# Patient Record
Sex: FEMALE | Race: WHITE | HISPANIC OR LATINO | Employment: STUDENT | URBAN - METROPOLITAN AREA
[De-identification: names, ages, dates, MRNs, and addresses within clinical notes are randomized per-mention and may not be internally consistent; named-entity substitution may affect disease eponyms.]

---

## 2023-03-28 DIAGNOSIS — F33.41 RECURRENT MAJOR DEPRESSIVE DISORDER, IN PARTIAL REMISSION (CMS-HCC): Primary | ICD-10-CM

## 2023-03-28 PROBLEM — E88.819 INSULIN RESISTANCE: Status: ACTIVE | Noted: 2023-03-28

## 2023-03-28 PROBLEM — F32.A DEPRESSION: Status: ACTIVE | Noted: 2023-03-28

## 2023-03-28 PROBLEM — J30.9 ALLERGIC RHINITIS: Status: ACTIVE | Noted: 2023-03-28

## 2023-03-28 PROBLEM — J30.2 SEASONAL ALLERGIES: Status: ACTIVE | Noted: 2023-03-28

## 2023-03-28 PROBLEM — F90.9 ADHD (ATTENTION DEFICIT HYPERACTIVITY DISORDER): Status: ACTIVE | Noted: 2023-03-28

## 2023-03-28 PROBLEM — R79.89 ELEVATED TESTOSTERONE LEVEL IN FEMALE: Status: ACTIVE | Noted: 2023-03-28

## 2023-03-28 PROBLEM — E28.8 HYPERANDROGENISM: Status: ACTIVE | Noted: 2023-03-28

## 2023-03-28 PROBLEM — E28.2 PCOS (POLYCYSTIC OVARIAN SYNDROME): Status: ACTIVE | Noted: 2023-03-28

## 2023-03-28 PROBLEM — R79.89 ELEVATED DEHYDROEPIANDROSTERONE SULFATE LEVEL: Status: ACTIVE | Noted: 2023-03-28

## 2023-03-28 PROBLEM — G40.909 EPILEPSY, UNSPECIFIED, NOT INTRACTABLE, WITHOUT STATUS EPILEPTICUS (MULTI): Status: ACTIVE | Noted: 2023-03-28

## 2023-03-28 PROBLEM — F84.0 AUTISM (HHS-HCC): Status: ACTIVE | Noted: 2023-03-28

## 2023-03-28 RX ORDER — NORGESTIMATE AND ETHINYL ESTRADIOL 0.25-0.035
1 KIT ORAL DAILY
COMMUNITY
Start: 2018-08-06

## 2023-03-28 RX ORDER — SERTRALINE HYDROCHLORIDE 100 MG/1
100 TABLET, FILM COATED ORAL 2 TIMES DAILY
Qty: 60 TABLET | Refills: 0 | Status: SHIPPED | OUTPATIENT
Start: 2023-03-28 | End: 2023-04-26

## 2023-03-28 RX ORDER — SERTRALINE HYDROCHLORIDE 100 MG/1
1 TABLET, FILM COATED ORAL 2 TIMES DAILY
COMMUNITY
Start: 2023-02-19 | End: 2023-03-28 | Stop reason: SDUPTHER

## 2023-03-28 RX ORDER — OXCARBAZEPINE 300 MG/1
TABLET, FILM COATED ORAL 2 TIMES DAILY
COMMUNITY
End: 2023-12-05 | Stop reason: SDUPTHER

## 2023-03-28 RX ORDER — METFORMIN HYDROCHLORIDE 500 MG/1
1000 TABLET ORAL 2 TIMES DAILY
COMMUNITY

## 2023-03-28 RX ORDER — POLYETHYLENE GLYCOL 3350 17 G/17G
17 POWDER, FOR SOLUTION ORAL DAILY
COMMUNITY
Start: 2020-08-12 | End: 2023-07-28 | Stop reason: ALTCHOICE

## 2023-03-28 RX ORDER — LISDEXAMFETAMINE DIMESYLATE 40 MG/1
40 CAPSULE ORAL
COMMUNITY
End: 2023-05-23 | Stop reason: SDUPTHER

## 2023-04-23 DIAGNOSIS — F33.41 RECURRENT MAJOR DEPRESSIVE DISORDER, IN PARTIAL REMISSION (CMS-HCC): ICD-10-CM

## 2023-04-26 RX ORDER — SERTRALINE HYDROCHLORIDE 100 MG/1
TABLET, FILM COATED ORAL
Qty: 60 TABLET | Refills: 0 | Status: SHIPPED | OUTPATIENT
Start: 2023-04-26 | End: 2023-05-24

## 2023-05-09 LAB
ALANINE AMINOTRANSFERASE (SGPT) (U/L) IN SER/PLAS: 12 U/L (ref 7–45)
ALBUMIN (G/DL) IN SER/PLAS: 4.4 G/DL (ref 3.4–5)
ALKALINE PHOSPHATASE (U/L) IN SER/PLAS: 75 U/L (ref 33–110)
ANION GAP IN SER/PLAS: 15 MMOL/L (ref 10–20)
ASPARTATE AMINOTRANSFERASE (SGOT) (U/L) IN SER/PLAS: 18 U/L (ref 9–39)
BILIRUBIN TOTAL (MG/DL) IN SER/PLAS: 0.3 MG/DL (ref 0–1.2)
CALCIUM (MG/DL) IN SER/PLAS: 9.7 MG/DL (ref 8.6–10.6)
CARBON DIOXIDE, TOTAL (MMOL/L) IN SER/PLAS: 27 MMOL/L (ref 21–32)
CHLORIDE (MMOL/L) IN SER/PLAS: 102 MMOL/L (ref 98–107)
CHOLESTEROL (MG/DL) IN SER/PLAS: 233 MG/DL (ref 0–199)
CHOLESTEROL IN HDL (MG/DL) IN SER/PLAS: 65.3 MG/DL
CHOLESTEROL/HDL RATIO: 3.6
CREATININE (MG/DL) IN SER/PLAS: 0.66 MG/DL (ref 0.5–1.05)
ESTIMATED AVERAGE GLUCOSE FOR HBA1C: 100 MG/DL
GFR FEMALE: >90 ML/MIN/1.73M2
GLUCOSE (MG/DL) IN SER/PLAS: 75 MG/DL (ref 74–99)
HEMOGLOBIN A1C/HEMOGLOBIN TOTAL IN BLOOD: 5.1 %
LDL: 119 MG/DL (ref 0–109)
NON HDL CHOLESTEROL: 168 MG/DL (ref 0–119)
POTASSIUM (MMOL/L) IN SER/PLAS: 4.1 MMOL/L (ref 3.5–5.3)
PROTEIN TOTAL: 7.1 G/DL (ref 6.4–8.2)
SODIUM (MMOL/L) IN SER/PLAS: 140 MMOL/L (ref 136–145)
TRIGLYCERIDE (MG/DL) IN SER/PLAS: 244 MG/DL (ref 0–149)
UREA NITROGEN (MG/DL) IN SER/PLAS: 10 MG/DL (ref 6–23)
VLDL: 49 MG/DL (ref 0–40)

## 2023-05-16 LAB
TESTOSTERONE FREE (CHAN): 4.6 PG/ML (ref 0.1–6.4)
TESTOSTERONE,TOTAL,LC-MS/MS: 75 NG/DL (ref 2–45)

## 2023-05-23 DIAGNOSIS — F33.41 RECURRENT MAJOR DEPRESSIVE DISORDER, IN PARTIAL REMISSION (CMS-HCC): ICD-10-CM

## 2023-05-23 DIAGNOSIS — F90.2 ATTENTION DEFICIT HYPERACTIVITY DISORDER (ADHD), COMBINED TYPE: Primary | ICD-10-CM

## 2023-05-23 RX ORDER — LISDEXAMFETAMINE DIMESYLATE 40 MG/1
40 CAPSULE ORAL
Qty: 30 CAPSULE | Refills: 0 | Status: SHIPPED | OUTPATIENT
Start: 2023-05-23 | End: 2023-05-30

## 2023-05-24 RX ORDER — SERTRALINE HYDROCHLORIDE 100 MG/1
TABLET, FILM COATED ORAL
Qty: 60 TABLET | Refills: 0 | Status: SHIPPED | OUTPATIENT
Start: 2023-05-24 | End: 2023-07-03

## 2023-05-30 ENCOUNTER — OFFICE VISIT (OUTPATIENT)
Dept: PEDIATRICS | Facility: CLINIC | Age: 23
End: 2023-05-30
Payer: COMMERCIAL

## 2023-05-30 ENCOUNTER — TELEPHONE (OUTPATIENT)
Dept: PEDIATRICS | Facility: CLINIC | Age: 23
End: 2023-05-30

## 2023-05-30 VITALS
BODY MASS INDEX: 32.4 KG/M2 | HEIGHT: 64 IN | SYSTOLIC BLOOD PRESSURE: 112 MMHG | DIASTOLIC BLOOD PRESSURE: 68 MMHG | WEIGHT: 189.8 LBS | HEART RATE: 120 BPM

## 2023-05-30 DIAGNOSIS — F84.0 AUTISM (HHS-HCC): ICD-10-CM

## 2023-05-30 DIAGNOSIS — F90.2 ATTENTION DEFICIT HYPERACTIVITY DISORDER (ADHD), COMBINED TYPE: Primary | ICD-10-CM

## 2023-05-30 DIAGNOSIS — R79.89 ELEVATED DEHYDROEPIANDROSTERONE SULFATE LEVEL: ICD-10-CM

## 2023-05-30 DIAGNOSIS — G40.109 PARTIAL SYMPTOMATIC EPILEPSY WITH SIMPLE PARTIAL SEIZURES, NOT INTRACTABLE, WITHOUT STATUS EPILEPTICUS (MULTI): ICD-10-CM

## 2023-05-30 PROBLEM — F32.A DEPRESSIVE DISORDER: Status: ACTIVE | Noted: 2017-08-25

## 2023-05-30 PROBLEM — G40.009: Status: RESOLVED | Noted: 2017-08-25 | Resolved: 2023-05-30

## 2023-05-30 PROCEDURE — 96127 BRIEF EMOTIONAL/BEHAV ASSMT: CPT | Performed by: PEDIATRICS

## 2023-05-30 PROCEDURE — 99215 OFFICE O/P EST HI 40 MIN: CPT | Performed by: PEDIATRICS

## 2023-05-30 PROCEDURE — 1036F TOBACCO NON-USER: CPT | Performed by: PEDIATRICS

## 2023-05-30 PROCEDURE — 3008F BODY MASS INDEX DOCD: CPT | Performed by: PEDIATRICS

## 2023-05-30 RX ORDER — CETIRIZINE HYDROCHLORIDE 10 MG/1
TABLET ORAL
COMMUNITY
Start: 2019-11-02

## 2023-05-30 RX ORDER — LISDEXAMFETAMINE DIMESYLATE 50 MG/1
50 CAPSULE ORAL DAILY
Qty: 30 CAPSULE | Refills: 0 | Status: SHIPPED | OUTPATIENT
Start: 2023-05-30 | End: 2023-07-28

## 2023-05-30 NOTE — PATIENT INSTRUCTIONS
Nuris presents for a medication follow up for ADHD, anxiety, depression. She has a history of autism. She is currently on Zoloft 100 mg, 1 tablet twice per day; and Vyvanse 40 mg, 1 capsule per day. She has made some impulsive decisions at work of a sexual nature. We have discussed getting a book on autism and relationships, counseling and increasing her Vyvanse to see if we can help her with her impulsivity.    I increased her Vyvanse to 50 mg, 1 capsule per day.

## 2023-06-23 DIAGNOSIS — F33.41 RECURRENT MAJOR DEPRESSIVE DISORDER, IN PARTIAL REMISSION (CMS-HCC): ICD-10-CM

## 2023-07-03 RX ORDER — SERTRALINE HYDROCHLORIDE 100 MG/1
TABLET, FILM COATED ORAL
Qty: 60 TABLET | Refills: 0 | Status: SHIPPED | OUTPATIENT
Start: 2023-07-03 | End: 2023-07-28 | Stop reason: SDUPTHER

## 2023-07-28 ENCOUNTER — OFFICE VISIT (OUTPATIENT)
Dept: PEDIATRICS | Facility: CLINIC | Age: 23
End: 2023-07-28
Payer: COMMERCIAL

## 2023-07-28 VITALS
DIASTOLIC BLOOD PRESSURE: 78 MMHG | BODY MASS INDEX: 32.33 KG/M2 | HEIGHT: 64 IN | SYSTOLIC BLOOD PRESSURE: 118 MMHG | WEIGHT: 189.4 LBS | HEART RATE: 84 BPM

## 2023-07-28 DIAGNOSIS — F32.A DEPRESSIVE DISORDER: ICD-10-CM

## 2023-07-28 DIAGNOSIS — F84.0 AUTISM (HHS-HCC): ICD-10-CM

## 2023-07-28 DIAGNOSIS — F90.2 ATTENTION DEFICIT HYPERACTIVITY DISORDER (ADHD), COMBINED TYPE: Primary | ICD-10-CM

## 2023-07-28 DIAGNOSIS — F33.41 RECURRENT MAJOR DEPRESSIVE DISORDER, IN PARTIAL REMISSION (CMS-HCC): ICD-10-CM

## 2023-07-28 PROCEDURE — 3008F BODY MASS INDEX DOCD: CPT | Performed by: PEDIATRICS

## 2023-07-28 PROCEDURE — 96127 BRIEF EMOTIONAL/BEHAV ASSMT: CPT | Performed by: PEDIATRICS

## 2023-07-28 PROCEDURE — 99215 OFFICE O/P EST HI 40 MIN: CPT | Performed by: PEDIATRICS

## 2023-07-28 PROCEDURE — 1036F TOBACCO NON-USER: CPT | Performed by: PEDIATRICS

## 2023-07-28 RX ORDER — SERTRALINE HYDROCHLORIDE 100 MG/1
TABLET, FILM COATED ORAL
Qty: 60 TABLET | Refills: 5 | Status: SHIPPED | OUTPATIENT
Start: 2023-07-28 | End: 2024-01-19 | Stop reason: SDUPTHER

## 2023-07-28 RX ORDER — LISDEXAMFETAMINE DIMESYLATE 60 MG/1
60 CAPSULE ORAL EVERY MORNING
Qty: 30 CAPSULE | Refills: 0 | Status: SHIPPED | OUTPATIENT
Start: 2023-07-28 | End: 2024-01-19 | Stop reason: SDUPTHER

## 2023-07-28 NOTE — PROGRESS NOTES
Subjective   Patient ID: Nuris Andersen is a 23 y.o. female, otherwise healthy, who presents for Follow-up (ADHD, anxiety, and depression follow up).  She is accompanied today by her mother.    HPI  Nuris Andersen is a 23 y.o. female presenting for a medication follow up for ADHD, anxiety and depression. She has a history of autism. Medication and allergy histories were reviewed. She is currently on Zoloft 100 mg, 1 tablet twice per day; and Vyvanse 50 mg, 1 capsule per day.     Mom feels the patient is still a little impulsive and she would like to try a higher dose of Vyvanse.    Mom states the patient needs to focus on improving her social skills.    The PHQ-9A is 2. This result was 4 on 5/30/2023. The patient feel that these symptoms are not at all difficult.  The severity measure for depression age 11-17, PHQ-9 modified for adolescence scoring results are as follows: 0-4 = none, 5-9 = mild, 10-14 = moderate, 15-19 = moderately severe, and 20-27 = severe.      The PASCUAL-7 is 2. This result was 3 on 5/30/2023. The patient feel that these symptoms are not at all difficult.  The scoring scale is as follows: 0-5 is minimal anxiety, 6-10 is mild anxiety, 11-15 is moderate anxiety, and 16-21 is severe anxiety. A score greater than 7 is clinically significant.     I have personally reviewed the OARRS report for this patient on 5/30/2023. I have considered the risks of abuse, dependence, addition, and diversion.      I have reviewed the controlled substance agreement with patient/caregiver on 5/30/2023.    Review of Systems  The following history was obtained from patient and mother.   Constitutional: Otherwise denies fever, chills, or changes in behavior. No difficulties with sleeping, eating, drinking, urine output, or bowel movements.    Eyes, ENT: Denies eye complaints, ear complaints, nasal congestion, runny nose, or sore throat.   Cardio/Resp: Denies chest pain, palpitations, shortness of breath, wheezing,  "stridor at rest, cough, working hard to breathe, or breathing fast.   GI/Renal: Denies nausea, vomiting, stomachache, diarrhea, or constipation. Denies dysuria or abnormal urine color or smell.   Musculoskeletal/Skin: Denies muscle or joint complaints. Denies skin rash.   Neuro/Psych: Positive ADHD, anxiety, depression, autism. Denies headache, dizziness, confusion, irritability, or fussiness.   Endo/heme/lymph: Denies excessive thirst, excessive sweating, bruising, bleeding, or swollen glands.     Objective   /78   Pulse 84   Ht 1.613 m (5' 3.5\")   Wt 85.9 kg (189 lb 6.4 oz)   BMI 33.02 kg/m²   BSA: 1.96 meters squared  Growth percentiles: Facility age limit for growth %shannon is 20 years. Facility age limit for growth %shannon is 20 years.     Physical Exam  Constitutional: Well developed, well nourished, well hydrated and no acute distress.  Head and Face: Normocephalic, atraumatic.  Inspection and palpation of the face: Normal.  Eyes: Conjunctiva and lids normal.  Ears, Nose, Mouth, and Throat: No nasal discharge. External ears and nose without deformities. TM's normal color, normal landmarks, no fluid, non-retracted. External auditory canals without swelling, redness or tenderness. Pharyngeal mucosa normal. No erythema, exudate, or lesions. Mucous membranes moist. Internal nose without abnormalities.  Neck: No significant cervical adenopathy. Thyroid not enlarged.  Pulmonary: No grunting, flaring or retractions. Clear to auscultation.  Cardiovascular: Regular rate and rhythm. No significant murmur.  Chest: Normal without deformity.  Abdomen: Soft, non-tender, no masses. No hepatomegaly or splenomegaly.     Problem List Items Addressed This Visit          Mental Health    ADHD (attention deficit hyperactivity disorder) - Primary    Relevant Medications    lisdexamfetamine (Vyvanse) 60 mg capsule    Depressive disorder    Relevant Medications    sertraline (Zoloft) 100 mg tablet       Neuro    Autism "     Other Visit Diagnoses       Recurrent major depressive disorder, in partial remission (CMS/[facility])        Relevant Medications    sertraline (Zoloft) 100 mg tablet          Time in: 3:15 pm  Time done: 4:17 pm    Assessment/Plan    Nuris presents for a medication follow up for ADHD, anxiety and depression. She has a history of autism. She is currently on Zoloft 100 mg, 1 tablet twice per day; and Vyvanse 50 mg, 1 capsule per day. We had a long discussion about socialization and how Nuris can learn social skills. We also discussed stories about people on the autism spectrum that Nuris can relate to.    I increased her Vyvanse to 60 mg, 1 capsule per day.    PLEASE FOLLOW-UP WITH ME IN 1 MONTH.     Her Zoloft is good for 6 months.    I recommend you look into [employer] for helping Nuris get a job.    Scribe Attestation  By signing my name below, I, Zoey Davis, attest that this documentation has been prepared under the direction and in the presence of Dr. Rimma Arrington.    Provider Attestation - Scribe documentation  All medical record entries made by the Scribe were at my direction and personally dictated by me. I have reviewed the chart and agree that the record accurately reflects my personal performance of the history, physical exam, discussion and plan.

## 2023-07-28 NOTE — PATIENT INSTRUCTIONS
Nuris presents for a medication follow up for ADHD, anxiety and depression. She has a history of autism. She is currently on Zoloft 100 mg, 1 tablet twice per day; and Vyvanse 50 mg, 1 capsule per day. We had a long discussion about socialization and how Nuris can learn social skills. We also discussed stories about people on the autism spectrum that Nuris can relate to.    I increased her Vyvanse to 60 mg, 1 capsule per day.    PLEASE FOLLOW-UP WITH ME IN 1 MONTH.     Her Zoloft is good for 6 months.    I recommend you look into [employer] for helping Nuris get a job.

## 2023-08-11 DIAGNOSIS — F90.2 ATTENTION DEFICIT HYPERACTIVITY DISORDER (ADHD), COMBINED TYPE: ICD-10-CM

## 2023-08-11 RX ORDER — LISDEXAMFETAMINE DIMESYLATE 30 MG/1
60 CAPSULE ORAL EVERY MORNING
Qty: 60 CAPSULE | Refills: 0 | Status: SHIPPED | OUTPATIENT
Start: 2023-08-11 | End: 2023-09-19 | Stop reason: SDUPTHER

## 2023-09-19 DIAGNOSIS — F90.2 ATTENTION DEFICIT HYPERACTIVITY DISORDER (ADHD), COMBINED TYPE: ICD-10-CM

## 2023-09-20 RX ORDER — LISDEXAMFETAMINE DIMESYLATE 30 MG/1
60 CAPSULE ORAL EVERY MORNING
Qty: 60 CAPSULE | Refills: 0 | Status: SHIPPED | OUTPATIENT
Start: 2023-10-17 | End: 2024-01-08 | Stop reason: SDUPTHER

## 2023-09-20 RX ORDER — LISDEXAMFETAMINE DIMESYLATE 30 MG/1
60 CAPSULE ORAL EVERY MORNING
Qty: 60 CAPSULE | Refills: 0 | Status: SHIPPED | OUTPATIENT
Start: 2023-09-20 | End: 2024-01-19 | Stop reason: SDUPTHER

## 2023-09-20 RX ORDER — LISDEXAMFETAMINE DIMESYLATE 30 MG/1
60 CAPSULE ORAL EVERY MORNING
Qty: 60 CAPSULE | Refills: 0 | Status: SHIPPED | OUTPATIENT
Start: 2023-11-15 | End: 2024-01-19 | Stop reason: SDUPTHER

## 2023-12-05 DIAGNOSIS — G40.909 SEIZURE DISORDER (MULTI): ICD-10-CM

## 2023-12-05 RX ORDER — OXCARBAZEPINE 300 MG/1
TABLET, FILM COATED ORAL
Qty: 105 TABLET | Refills: 5 | Status: SHIPPED | OUTPATIENT
Start: 2023-12-05 | End: 2024-06-03

## 2024-01-08 DIAGNOSIS — F90.2 ATTENTION DEFICIT HYPERACTIVITY DISORDER (ADHD), COMBINED TYPE: ICD-10-CM

## 2024-01-08 RX ORDER — LISDEXAMFETAMINE DIMESYLATE 30 MG/1
60 CAPSULE ORAL EVERY MORNING
Qty: 60 CAPSULE | Refills: 0 | Status: SHIPPED | OUTPATIENT
Start: 2024-01-08 | End: 2024-02-07

## 2024-01-19 ENCOUNTER — OFFICE VISIT (OUTPATIENT)
Dept: PEDIATRICS | Facility: CLINIC | Age: 24
End: 2024-01-19
Payer: COMMERCIAL

## 2024-01-19 VITALS
HEIGHT: 64 IN | BODY MASS INDEX: 32.88 KG/M2 | HEART RATE: 60 BPM | SYSTOLIC BLOOD PRESSURE: 110 MMHG | WEIGHT: 192.6 LBS | DIASTOLIC BLOOD PRESSURE: 70 MMHG

## 2024-01-19 DIAGNOSIS — F90.2 ATTENTION DEFICIT HYPERACTIVITY DISORDER (ADHD), COMBINED TYPE: ICD-10-CM

## 2024-01-19 DIAGNOSIS — Z00.00 WELL ADULT EXAM: Primary | ICD-10-CM

## 2024-01-19 DIAGNOSIS — F33.41 RECURRENT MAJOR DEPRESSIVE DISORDER, IN PARTIAL REMISSION (CMS-HCC): ICD-10-CM

## 2024-01-19 PROCEDURE — 96127 BRIEF EMOTIONAL/BEHAV ASSMT: CPT | Performed by: PEDIATRICS

## 2024-01-19 PROCEDURE — 90472 IMMUNIZATION ADMIN EACH ADD: CPT | Performed by: PEDIATRICS

## 2024-01-19 PROCEDURE — 90715 TDAP VACCINE 7 YRS/> IM: CPT | Performed by: PEDIATRICS

## 2024-01-19 PROCEDURE — 3008F BODY MASS INDEX DOCD: CPT | Performed by: PEDIATRICS

## 2024-01-19 PROCEDURE — 90471 IMMUNIZATION ADMIN: CPT | Performed by: PEDIATRICS

## 2024-01-19 PROCEDURE — 1036F TOBACCO NON-USER: CPT | Performed by: PEDIATRICS

## 2024-01-19 PROCEDURE — 99395 PREV VISIT EST AGE 18-39: CPT | Performed by: PEDIATRICS

## 2024-01-19 PROCEDURE — 90686 IIV4 VACC NO PRSV 0.5 ML IM: CPT | Performed by: PEDIATRICS

## 2024-01-19 PROCEDURE — 99213 OFFICE O/P EST LOW 20 MIN: CPT | Performed by: PEDIATRICS

## 2024-01-19 RX ORDER — LISDEXAMFETAMINE DIMESYLATE 60 MG/1
60 CAPSULE ORAL EVERY MORNING
Qty: 30 CAPSULE | Refills: 0 | Status: SHIPPED | OUTPATIENT
Start: 2024-02-05 | End: 2024-03-15 | Stop reason: SDUPTHER

## 2024-01-19 RX ORDER — SERTRALINE HYDROCHLORIDE 100 MG/1
TABLET, FILM COATED ORAL
Qty: 60 TABLET | Refills: 5 | Status: SHIPPED | OUTPATIENT
Start: 2024-01-19

## 2024-01-19 RX ORDER — LISDEXAMFETAMINE DIMESYLATE 30 MG/1
60 CAPSULE ORAL EVERY MORNING
Qty: 60 CAPSULE | Refills: 0 | Status: SHIPPED | OUTPATIENT
Start: 2024-03-04 | End: 2024-04-03

## 2024-01-19 RX ORDER — LISDEXAMFETAMINE DIMESYLATE 30 MG/1
60 CAPSULE ORAL EVERY MORNING
Qty: 60 CAPSULE | Refills: 0 | Status: SHIPPED | OUTPATIENT
Start: 2024-04-01 | End: 2024-05-01

## 2024-01-19 NOTE — PROGRESS NOTES
HPI:  Nuris is a 23 y.o. female who presents today with her mother for her Health Maintenance and Supervision Exam. Medication and allergy histories were reviewed. The patient has autism, PCOS, insulin resistance, a history of epilepsy, ADHD and depression. She is currently on Vyvanse 30 mg, 2 capsules per day; and Sertraline 100 mg, 1 tablet twice per day.    Her endocrinologist is Dr. Aguilar.     She has allergies with weather changes.    The PHQ-9A is 4. This result was 2 on 7/28/2023. The patient feel that these symptoms are not at all difficult.  The severity measure for depression age 11-17, PHQ-9 modified for adolescence scoring results are as follows: 0-4 = none, 5-9 = mild, 10-14 = moderate, 15-19 = moderately severe, and 20-27 = severe.     The PASCUAL-7 is 2. This result was 2 on 7/28/2023. The patient feel that these symptoms are not at all difficult.  The scoring scale is as follows: 0-5 is minimal anxiety, 6-10 is mild anxiety, 11-15 is moderate anxiety, and 16-21 is severe anxiety. A score greater than 7 is clinically significant.     I have personally reviewed the OARRS report for this patient on 1/19/24. I have considered the risks of abuse, dependence, addition, and diversion.      I have reviewed the controlled substance agreement with patient/caregiver on 1/19/24.     General Health:  Nuris is overall in good health.  Concerns today: No    Social and Family History:  At home, there have been no interval changes.  Parental support, work/family balance? YES    Nutrition:  Current Diet: vegetables, fruits, meats, dairy    Food Security:  Within the past 12 months, have you worried that your food would run out before you got money to buy more?   NO  Within the past 12 months, the food you bought just did not last and you did not have money to get more?  NO    Dental Care:  Nuris has a dental home? YES  Dental hygiene regularly performed? YES  Fluoridated water: YES    Elimination:  Elimination  patterns appropriate:  YES    Sleep:  Sleep patterns appropriate? See below.  Sleep problems: Trouble getting to sleep.    Sex specific Data:  Women:  Excessive cramping?  NO    Missed school due to cramping?  NO  Regular menstrual cycle? YES   Days bleeding?  3     Days heavy bleeding? 2  How many tampons or pads used in a 24 hour period at the heaviest? 4    Behavior/Socialization:  Activities with peers? YES. She may be starting an adult program through [employer] with the program [employer].  Close friends or family? YES    Education:  Nuris is working on socializing.  Any educational accommodations? Yes- working with [employer].    Activities:  Physical Activity and sports: YES - she goes out for walks three times per week.    Sports clearance questions:  Have you ever had a concussion?  No  Have you ever fainted?  No  Have you ever had shortness of breath more than others?  No  Have you ever had rapid or skipped heartbeats?  No   Have you ever had chest pain?  No   Has anyone in your family had a heart attack before the age of 50?  No  Has anyone in your family  without a cause before the age of 50?  No   Has anyone in your family been diagnosed with Faina-Parkinson-White syndrome, long QT syndrome or Lizzeth syndrome?  No    Has anyone in your family been diagnosed with unexplained arrhythmias, or cardiomyopathy?  NO    RISK factors:  Dating? NO  Self designated: polysexual  Self identity: questioning? NO  Sexual activity? NO  Number of partners: 0.  Form of birth control: 0  Alcohol?  NO  Marijuana? NO  Drugs?  NO  Smoking? NO  Vaping? NO    Review of Systems:  Constitutional: Positive sleep issues. Otherwise denies fever, chills, or changes in behavior. No difficulties with eating, drinking, urine output, or bowel movements.    Eyes, ENT: Denies eye complaints, ear complaints, nasal congestion, runny nose, or sore throat.   Cardio/Resp: Denies chest pain, palpitations, shortness of breath, wheezing, stridor  at rest, cough, working hard to breathe, or breathing fast.   GI/Renal: Denies nausea, vomiting, stomachache, diarrhea, or constipation. Denies dysuria or abnormal urine color or smell.   Musculoskeletal/Skin: Positive cut on ankle from shaving. Denies muscle or joint complaints.  Neuro/Psych: Positive autism, history of epilepsy, ADHD, depression. Denies headache, dizziness, or confusion.  Endo/heme/lymph: Positive PCOS, insulin resistance. Denies excessive thirst, excessive sweating, bruising, bleeding, or swollen glands.     Safety Assessment:  Safety topics were reviewed  Seat belt: YES  Refusing to be a passenger if the  is compromised: YES    Exposure to pets: YES - cat    Fire Safety Plan: YES       Bedroom door closed when sleeping:  NO  Smoke detectors: YES       Second hand smoke: No  Fire extinguisher: NO       Carbon monoxide detectors: YES  Sun safety/ Sunscreen: YES      Water Safety: YES   Heat safety: YES              Firearms in house: NO    Helmet and Mouthguard:  YES           Internet and texting safety: YES     Physical Exam  Vitals reviewed.   Constitutional:       General: Obese.     Appearance: In good spirits.  HENT:      Head: Normocephalic.      Right Ear: External ear normal and without deformities. Normal TM.      Left Ear: External ear normal and without deformities. Normal TM.      Nose: Nose normal, patent nares and without deformities.      Mouth/Throat: Normal palate     Mouth: Mucous membranes are moist.      Pharynx: Mildly injected uvula.  Neck:     General: Normal. No lymphadenopathy.     Eyes:      Extraocular Movements: Extraocular movements intact.      Conjunctiva/sclera: Conjunctivae normal.      Pupils: Pupils are equal, round, and reactive to light.   Cardiovascular:      Rate and Rhythm: Normal rate and regular rhythm.      Pulses: Normal pulses.      Heart sounds: Normal heart sounds.   Pulmonary:      Effort: Pulmonary effort is normal.      Breath sounds: Normal  breath sounds.   Abdominal:      General: Abdomen is flat.      Palpations: Abdomen is soft.   Musculoskeletal:         General: Normal range of motion, strength and tone.     Cervical back: Normal range of motion and neck supple.   Skin:     General: Skin is warm and dry.      Capillary Refill: Capillary refill takes less than 2 seconds.      Turgor: Normal.   Neurological:      General: No focal deficit present.      Mental Status: female is alert.       Problem List Items Addressed This Visit          Health Encounters    Well adult exam - Primary       Mental Health    ADHD (attention deficit hyperactivity disorder)    Relevant Medications    lisdexamfetamine (Vyvanse) 60 mg capsule (Start on 2/5/2024)    lisdexamfetamine (Vyvanse) 30 mg capsule (Start on 3/4/2024)    lisdexamfetamine (Vyvanse) 30 mg capsule (Start on 4/1/2024)    Recurrent major depressive disorder, in partial remission (CMS/HCC)    Relevant Medications    sertraline (Zoloft) 100 mg tablet     Time in: 2:08 pm  Time done: 3:00 pm    Assessment & Plan:   Thank you for involving me in Nuris 's care today. Nuris is growing well in a warm and nurturing environment. The patient has autism, PCOS, insulin resistance, a history of epilepsy, ADHD and depression. She is currently on Vyvanse 30 mg, 2 capsules per day; and Sertraline 100 mg, 1 tablet twice per day. Cleared for sports.     Her medications are good for 6 months.    We discussed guardianship. This visit counts for guardianship, but per guardianship papers, the doctor's visit only counts for 30 days within submission.    Please remember, you cannot call the pharmacy for a refill of the stimulant, as each prescription is separate onto itself. Please record when you should call for the next prescription to be filled. I have ordered one to be refilled 2/5/24, the next month to be filled 3/4/24, and the third prescription can be filled 4/1/24. These days may change based on restrictions  "placed by your insurance company. Please call a week before you need the next one after that.     For safety, we talked about making a home fire safety plan and having a solid plan for where the family would congregate outside the house in the case of a fire inside the house.  Please also make sure that bedroom doors are closed at night as this will help save lives as well.  Also, please make sure you have a working fire extinguisher. The fire extinguisher in your kitchen should have a \"K\" on it. You should also have a fire blanket. It is important to note that smoke detectors and carbon monoxide detectors  after 10 years.     Scribe Attestation  By signing my name below, I, Zoey Davis, attest that this documentation has been prepared under the direction and in the presence of Dr. Rimma Arrington.    Provider Attestation - Scribe documentation  All medical record entries made by the Scribe were at my direction and personally dictated by me. I have reviewed the chart and agree that the record accurately reflects my personal performance of the history, physical exam, discussion and plan.   "

## 2024-01-19 NOTE — PATIENT INSTRUCTIONS
"Thank you for involving me in Nuris 's care today. Nuris is growing well in a warm and nurturing environment. The patient has autism, PCOS, insulin resistance, a history of epilepsy, ADHD and depression. She is currently on Vyvanse 30 mg, 2 capsules per day; and Sertraline 100 mg, 1 tablet twice per day. Cleared for sports.     Her medications are good for 6 months.    We discussed guardianship. This visit counts for guardianship, but per guardianship papers, the doctor's visit only counts for 30 days within submission.    Please remember, you cannot call the pharmacy for a refill of the stimulant, as each prescription is separate onto itself. Please record when you should call for the next prescription to be filled. I have ordered one to be refilled 24, the next month to be filled 3/4/24, and the third prescription can be filled 24. These days may change based on restrictions placed by your insurance company. Please call a week before you need the next one after that.     For safety, we talked about making a home fire safety plan and having a solid plan for where the family would congregate outside the house in the case of a fire inside the house.  Please also make sure that bedroom doors are closed at night as this will help save lives as well.  Also, please make sure you have a working fire extinguisher. The fire extinguisher in your kitchen should have a \"K\" on it. You should also have a fire blanket. It is important to note that smoke detectors and carbon monoxide detectors  after 10 years.   "

## 2024-03-15 DIAGNOSIS — F90.2 ATTENTION DEFICIT HYPERACTIVITY DISORDER (ADHD), COMBINED TYPE: ICD-10-CM

## 2024-03-18 RX ORDER — LISDEXAMFETAMINE DIMESYLATE 60 MG/1
60 CAPSULE ORAL EVERY MORNING
Qty: 30 CAPSULE | Refills: 0 | Status: SHIPPED | OUTPATIENT
Start: 2024-03-18 | End: 2024-05-15 | Stop reason: SDUPTHER

## 2024-05-10 DIAGNOSIS — E28.2 PCOS (POLYCYSTIC OVARIAN SYNDROME): ICD-10-CM

## 2024-05-10 RX ORDER — NORGESTIMATE AND ETHINYL ESTRADIOL 0.25-0.035
1 KIT ORAL DAILY
Qty: 28 TABLET | Refills: 1 | Status: SHIPPED | OUTPATIENT
Start: 2024-05-10

## 2024-05-15 DIAGNOSIS — F90.2 ATTENTION DEFICIT HYPERACTIVITY DISORDER (ADHD), COMBINED TYPE: ICD-10-CM

## 2024-05-15 RX ORDER — LISDEXAMFETAMINE DIMESYLATE 60 MG/1
60 CAPSULE ORAL EVERY MORNING
Qty: 30 CAPSULE | Refills: 0 | Status: SHIPPED | OUTPATIENT
Start: 2024-05-15 | End: 2024-06-14

## 2024-05-31 DIAGNOSIS — G40.909 SEIZURE DISORDER (MULTI): ICD-10-CM

## 2024-06-03 RX ORDER — OXCARBAZEPINE 300 MG/1
TABLET, FILM COATED ORAL
Qty: 105 TABLET | Refills: 5 | Status: SHIPPED | OUTPATIENT
Start: 2024-06-03

## 2024-06-29 DIAGNOSIS — E28.2 PCOS (POLYCYSTIC OVARIAN SYNDROME): ICD-10-CM

## 2024-07-01 NOTE — PROGRESS NOTES
Subjective   Nuris Andersen is a 23 y.o. female with Autism, epilepsy, ADHD, and developmental differences who presents for Polycystic Ovary Syndrome and Follow-up. She was seen today with the assistance of Charlene Pearson MD, PGY-3.     Initial History:   Nuris presented in 2018 at age 17 years.   Menarche 13yrs, never regular. Had polymenorrhea then oligomenorrhea. C/O hirsutism.   She was noted to have acanthosis. Labs were performed including prolactin, CMP, total and free Testosterone and CAH diagnostic panel. Labs were consistent with PCOS. Persistently elevated DHEA-S is consistent with an adrenal phenotype. Adrenal MRI performed in 2019 did not show adrenal adenoma. Dr. Barrietnos recommended Ortho-cyclen which was started in 2018. Metformin started in Jan 2020 due to OGTT with insulin resistance and ongoing elevated free testosterone.   Last visit was May 2023, at which time she was taking Metformin 1000mg BID and ortho cyclen 0.25-0.35mg-mcg.      Interval History:   Menstrual cycle: periods are typically regular, however has not had a period this month. Last period was during the first week of June. Currently on the third day of green pills, which is when she typically gets her periods. Unusual that her period hasn't started yet. Periods typically last 5 days with the first 3 days being heavier. Denies being sexually active. Denies missing any pills or spotting over the last month. Discussed DTIs and safe sex for general information.     Weight loss: Staying active with trying to walk more. Is in physical therapy for her back. Trying to cut back on drinking sugary drinks and eating unhealthy snacks. Taking metformin daily. No side effects. Feels like acanthosis has not increased since last visit.    Hyperandrogenism: Feels like acne is well controlled on OTC acne medication (I.e. salicylic acid, benzyl peroxide, toner and basic moisturizer). Current concern includes feeling like she is having hair  "thinning at the top of her head. Denies excessive hair loss in the shower drain or around the house. Feels like hirsutism is well controlled. Still has to manual remove hair on chin.     Current medications:   - Metformin 1000mg BID; no side effects  - Ortho Cyclen 0.25-35 mg-mcg    ROS:   Negative except as mentioned in HPI.     Objective   Ht 1.626 m (5' 4.02\")   Wt 89.9 kg (198 lb 3.1 oz)   BMI 34.00 kg/m²     Physical Exam:  General: well appearing girl in no distress  HEENT: normocephalic, atraumatic,coarser facial features  Teeth: good dentition  Thyroid: non-enlarged thyroid gland with no masses, no cervical lymphadenopathy  Neck: very light acanthosis   CV: Normal S1, S2, Regular rate and rhythm  Resp: non-labored breathing, clear to auscultation  Abdomen: soft, non tender, no organomegaly   Skin: no rashes; no acne seen; no hairs seen on face; but they shave, very light acanthosis in bilateral underarms  Neuro: normal tone, grossly normal movements, patellar reflexes normal     Labs:  Obtaining labs after appointment    Lab Results   Component Value Date    LH 7.4 07/12/2021    FSH 5.8 07/12/2021    TESTOSTERONE 62 (H) 07/12/2018    DHEAS 707 (H) 05/13/2020    17HYDROXYPRO 130 07/12/2018    PROLACTIN 20.7 (H) 07/12/2018    TESTOTOTMS 75 (H) 05/09/2023    TESTF 4.6 05/09/2023        Assessment/Plan   Assessment:  Nuris Andersen is a 23 y.o. female with Autism, ADHD, and developmental differences presenting for follow-up of PCOS w/ associated insulin resistance and mildly elevated LDL-C. She has gained 6lbs since she was seen last year. She could benefit from further optimization of lifestyle modifications. Will have our dietician see her this morning and recommended increasing physical activity to help with weight loss and encourage improved cardiovascular health. Her menstrual cycle is late this month, which is unusual for her. Denies being sexually active or any missed doses. She is tolerating " treatment with orthocyclin well. Nuris is concerned about hair thinning, which may be secondary to ongoing hyper-androgenism due to PCOS. Discussed risks and benefits of starting spironolactone with Nuris and family. Nuris is interested in starting spironolactone. Will obtain labs today.     Plan:   - continue Orthocyclen  - continue Metformin 500mg tablets, 2 tabs BID  - Start spironolactone 50 mg once daily if HCG and potassium normal today; will need repeat potassium levels a week after starting  - Dietician visit   Obtain labs:   -     Comprehensive Metabolic Panel; Future  -     Hemoglobin A1C; Future  -     Lipid Panel; Future  -     Testosterone,Free and Total; Future  -     DHEA-Sulfate; Future  -     Human Chorionic Gonadotropin; Future  -     Renal Function Panel; Future    Follow-up in 6 months to check on progress with weight and effects of new medication.     Jessica Pearson MD.     I saw and evaluated the patient. I agree with the findings and plan of care as documented in the resident note.     Nuris is tolerating her OCP and metformin, but continues to have elevated total testosterone. She is bothered by frontal hair loss and would like to try spironolactone. Will get baseline labs and get her RFP checked in 1 weeks. Counseled regarding teratogenic potential and potassium issues.     Zita Aguilar MD

## 2024-07-02 ENCOUNTER — OFFICE VISIT (OUTPATIENT)
Dept: PEDIATRIC ENDOCRINOLOGY | Facility: CLINIC | Age: 24
End: 2024-07-02
Payer: COMMERCIAL

## 2024-07-02 ENCOUNTER — LAB (OUTPATIENT)
Dept: LAB | Facility: LAB | Age: 24
End: 2024-07-02
Payer: COMMERCIAL

## 2024-07-02 ENCOUNTER — NUTRITION (OUTPATIENT)
Dept: PEDIATRIC ENDOCRINOLOGY | Facility: CLINIC | Age: 24
End: 2024-07-02

## 2024-07-02 VITALS — HEIGHT: 64 IN | WEIGHT: 198.19 LBS | BODY MASS INDEX: 33.84 KG/M2

## 2024-07-02 DIAGNOSIS — E66.9 OBESITY (BMI 30.0-34.9): ICD-10-CM

## 2024-07-02 DIAGNOSIS — E28.2 PCOS (POLYCYSTIC OVARIAN SYNDROME): ICD-10-CM

## 2024-07-02 DIAGNOSIS — L65.9 ALOPECIA: ICD-10-CM

## 2024-07-02 DIAGNOSIS — E28.2 PCOS (POLYCYSTIC OVARIAN SYNDROME): Primary | ICD-10-CM

## 2024-07-02 DIAGNOSIS — R79.89 ELEVATED TESTOSTERONE LEVEL IN FEMALE: ICD-10-CM

## 2024-07-02 LAB
ALBUMIN SERPL BCP-MCNC: 4.4 G/DL (ref 3.4–5)
ALP SERPL-CCNC: 65 U/L (ref 33–110)
ALT SERPL W P-5'-P-CCNC: 9 U/L (ref 7–45)
ANION GAP SERPL CALC-SCNC: 15 MMOL/L (ref 10–20)
AST SERPL W P-5'-P-CCNC: 19 U/L (ref 9–39)
B-HCG SERPL-ACNC: <3 MIU/ML
BILIRUB SERPL-MCNC: 0.2 MG/DL (ref 0–1.2)
BUN SERPL-MCNC: 9 MG/DL (ref 6–23)
CALCIUM SERPL-MCNC: 9.3 MG/DL (ref 8.6–10.6)
CHLORIDE SERPL-SCNC: 101 MMOL/L (ref 98–107)
CHOLEST SERPL-MCNC: 284 MG/DL (ref 0–199)
CHOLESTEROL/HDL RATIO: 3.8
CO2 SERPL-SCNC: 26 MMOL/L (ref 21–32)
CREAT SERPL-MCNC: 0.61 MG/DL (ref 0.5–1.05)
DHEA-S SERPL-MCNC: 479 UG/DL (ref 65–395)
EGFRCR SERPLBLD CKD-EPI 2021: >90 ML/MIN/1.73M*2
EST. AVERAGE GLUCOSE BLD GHB EST-MCNC: 91 MG/DL
GLUCOSE SERPL-MCNC: 78 MG/DL (ref 74–99)
HBA1C MFR BLD: 4.8 %
HDLC SERPL-MCNC: 75.6 MG/DL
LDLC SERPL CALC-MCNC: 168 MG/DL
NON HDL CHOLESTEROL: 208 MG/DL (ref 0–149)
PHOSPHATE SERPL-MCNC: 4.8 MG/DL (ref 2.5–4.9)
POTASSIUM SERPL-SCNC: 4.6 MMOL/L (ref 3.5–5.3)
PROT SERPL-MCNC: 7 G/DL (ref 6.4–8.2)
SODIUM SERPL-SCNC: 137 MMOL/L (ref 136–145)
TRIGL SERPL-MCNC: 203 MG/DL (ref 0–149)
VLDL: 41 MG/DL (ref 0–40)

## 2024-07-02 PROCEDURE — 83036 HEMOGLOBIN GLYCOSYLATED A1C: CPT

## 2024-07-02 PROCEDURE — 84402 ASSAY OF FREE TESTOSTERONE: CPT

## 2024-07-02 PROCEDURE — 84100 ASSAY OF PHOSPHORUS: CPT

## 2024-07-02 PROCEDURE — 80053 COMPREHEN METABOLIC PANEL: CPT

## 2024-07-02 PROCEDURE — 84702 CHORIONIC GONADOTROPIN TEST: CPT

## 2024-07-02 PROCEDURE — 99215 OFFICE O/P EST HI 40 MIN: CPT | Performed by: PEDIATRICS

## 2024-07-02 PROCEDURE — 82627 DEHYDROEPIANDROSTERONE: CPT

## 2024-07-02 PROCEDURE — 80061 LIPID PANEL: CPT

## 2024-07-02 PROCEDURE — 36415 COLL VENOUS BLD VENIPUNCTURE: CPT

## 2024-07-02 RX ORDER — SPIRONOLACTONE 50 MG/1
50 TABLET, FILM COATED ORAL DAILY
Qty: 30 TABLET | Refills: 0 | Status: SHIPPED | OUTPATIENT
Start: 2024-07-02 | End: 2025-07-02

## 2024-07-02 RX ORDER — NORGESTIMATE AND ETHINYL ESTRADIOL 0.25-0.035
1 KIT ORAL DAILY
Qty: 28 TABLET | Refills: 1 | OUTPATIENT
Start: 2024-07-02

## 2024-07-02 RX ORDER — NORGESTIMATE AND ETHINYL ESTRADIOL 0.25-0.035
1 KIT ORAL DAILY
Qty: 28 TABLET | Refills: 12 | Status: SHIPPED | OUTPATIENT
Start: 2024-07-02 | End: 2025-07-02

## 2024-07-02 RX ORDER — METFORMIN HYDROCHLORIDE 500 MG/1
1000 TABLET ORAL 2 TIMES DAILY
Qty: 120 TABLET | Refills: 11 | Status: SHIPPED | OUTPATIENT
Start: 2024-07-02

## 2024-07-02 NOTE — PATIENT INSTRUCTIONS
Nice to see you Nuris    Please get labs today    Start spironolactone 50mg once daily    Check labs in 1 week to make sure potassium is not too high    Follow up in 4-6 months

## 2024-07-02 NOTE — PROGRESS NOTES
"Reason for Nutrition Visit:  Pt is a 23 y.o. female being seen for PCOS     Past Medical Hx:  Patient Active Problem List   Diagnosis    ADHD (attention deficit hyperactivity disorder)    Allergic rhinitis    Autism ([facility]-HCC)    Depressive disorder    Elevated dehydroepiandrosterone sulfate level    Elevated testosterone level in female    Epilepsy, unspecified, not intractable, without status epilepticus (Multi)    Hyperandrogenism    PCOS (polycystic ovarian syndrome)    Insulin resistance    Seasonal allergies    Recurrent major depressive disorder, in partial remission (CMS-HCC)    Well adult exam      Anthropometrics:         7/2/2024     8:02 AM   Vitals   Height (in) 1.626 m (5' 4.02\")   Weight (lb) 198.19   BMI 34 kg/m2   BSA (m2) 2.02 m2   Visit Report Report      Weight change:  gain 2.5 kg over 6 months     Lab Results   Component Value Date    HGBA1C 5.1 05/09/2023    CHOL 233 (H) 05/09/2023    LDLF 119 (H) 05/09/2023    TRIG 244 (H) 05/09/2023      Results for orders placed or performed in visit on 05/09/23   Lipid Panel   Result Value Ref Range    Cholesterol 233 (H) 0 - 199 mg/dL    HDL 65.3 mg/dL    Cholesterol/HDL Ratio 3.6      (H) 0 - 109 mg/dL    VLDL 49 (H) 0 - 40 mg/dL    Triglycerides 244 (H) 0 - 149 mg/dL    Non HDL Cholesterol 168 (H) 0 - 119 mg/dL     Medications:   Current Outpatient Medications on File Prior to Visit   Medication Sig Dispense Refill    cetirizine (ZyrTEC) 10 mg tablet Take by mouth.      lisdexamfetamine (Vyvanse) 30 mg capsule Take 2 capsules (60 mg) by mouth once daily in the morning. 60 capsule 0    lisdexamfetamine (Vyvanse) 30 mg capsule Take 2 capsules (60 mg) by mouth once daily in the morning. Do not start before March 4, 2024. 60 capsule 0    lisdexamfetamine (Vyvanse) 30 mg capsule Take 2 capsules (60 mg) by mouth once daily in the morning. Do not start before April 1, 2024. 60 capsule 0    lisdexamfetamine (Vyvanse) 60 mg capsule Take 1 capsule (60 mg) " by mouth once daily in the morning. 30 capsule 0    metFORMIN (Glucophage) 500 mg tablet Take 2 tablets (1,000 mg) by mouth in the morning and 2 tablets (1,000 mg) before bedtime. Take with food..      norgestimate-ethinyl estradioL (Ortho-Cyclen) 0.25-35 mg-mcg tablet Take 1 tablet by mouth once daily.      norgestimate-ethinyl estradioL (Ortho-Cyclen) 0.25-35 mg-mcg tablet Take 1 tablet by mouth once daily. 28 tablet 1    OXcarbazepine (Trileptal) 300 mg tablet take 1 AND 1/2 tablets by mouth every morning and 2 tablets by mouth every evening 105 tablet 5    sertraline (Zoloft) 100 mg tablet TAKE 1 TABLET BY MOUTH IN THE MORNING AND 1 TABLET BEFORE BEDTIME 60 tablet 5     No current facility-administered medications on file prior to visit.      24 Diet Recall:  Meal 1:  B - sausage + toast/pancake + fruit + water // cereal + milk   Meal 2:  L - (home or day service) - sandwich - turkey + cheese stick + chips + fruit + dessert   (and snack)   Meal 3:  D - hamburger + hot dog or pizza + vegetables (meat + veg) + water   Snacks:  sometimes dessert or something     Activity:  3-4 days a week     Allergies   Allergen Reactions    Pollen Extracts Runny nose     Estimated Energy Needs: 0279-3890 calories/day     Nutrition Diagnosis:    Diagnosis Statement 1:  Diagnosis Status: New  Diagnosis : Food and nutrition related knowledge deficit related to  optimal diet with PCOS diagnosis  as evidenced by diet history     Nutrition Goals:  Plan to walk more.  Drink water before meals.  Eat more fruits and vegetables.

## 2024-07-06 LAB
TESTOSTERONE FREE (CHAN): 2.3 PG/ML (ref 0.1–6.4)
TESTOSTERONE,TOTAL,LC-MS/MS: 50 NG/DL (ref 2–45)

## 2024-07-11 ENCOUNTER — APPOINTMENT (OUTPATIENT)
Dept: PEDIATRICS | Facility: CLINIC | Age: 24
End: 2024-07-11
Payer: COMMERCIAL

## 2024-07-11 VITALS
SYSTOLIC BLOOD PRESSURE: 108 MMHG | BODY MASS INDEX: 33.8 KG/M2 | DIASTOLIC BLOOD PRESSURE: 62 MMHG | HEIGHT: 64 IN | WEIGHT: 198 LBS | HEART RATE: 119 BPM

## 2024-07-11 DIAGNOSIS — F33.41 RECURRENT MAJOR DEPRESSIVE DISORDER, IN PARTIAL REMISSION (CMS-HCC): ICD-10-CM

## 2024-07-11 DIAGNOSIS — F90.2 ATTENTION DEFICIT HYPERACTIVITY DISORDER (ADHD), COMBINED TYPE: ICD-10-CM

## 2024-07-11 DIAGNOSIS — Z00.00 WELL ADULT EXAM: Primary | ICD-10-CM

## 2024-07-11 DIAGNOSIS — N62 MACROMASTIA: ICD-10-CM

## 2024-07-11 PROCEDURE — 99213 OFFICE O/P EST LOW 20 MIN: CPT | Performed by: PEDIATRICS

## 2024-07-11 PROCEDURE — 1036F TOBACCO NON-USER: CPT | Performed by: PEDIATRICS

## 2024-07-11 PROCEDURE — 99395 PREV VISIT EST AGE 18-39: CPT | Performed by: PEDIATRICS

## 2024-07-11 RX ORDER — SERTRALINE HYDROCHLORIDE 100 MG/1
100 TABLET, FILM COATED ORAL 2 TIMES DAILY
Qty: 180 TABLET | Refills: 1 | Status: SHIPPED | OUTPATIENT
Start: 2024-07-11 | End: 2025-01-07

## 2024-07-11 RX ORDER — LISDEXAMFETAMINE DIMESYLATE 30 MG/1
60 CAPSULE ORAL EVERY MORNING
Qty: 60 CAPSULE | Refills: 0 | Status: SHIPPED | OUTPATIENT
Start: 2024-08-08 | End: 2024-09-07

## 2024-07-11 RX ORDER — LISDEXAMFETAMINE DIMESYLATE 30 MG/1
60 CAPSULE ORAL EVERY MORNING
Qty: 60 CAPSULE | Refills: 0 | Status: SHIPPED | OUTPATIENT
Start: 2024-07-11 | End: 2024-08-10

## 2024-07-11 RX ORDER — LISDEXAMFETAMINE DIMESYLATE 30 MG/1
60 CAPSULE ORAL EVERY MORNING
Qty: 60 CAPSULE | Refills: 0 | Status: SHIPPED | OUTPATIENT
Start: 2024-09-05 | End: 2024-10-05

## 2024-07-11 NOTE — PROGRESS NOTES
"Subjective   Patient ID: Nuris Andersen is a 23 y.o. female, otherwise healthy, who presents for Follow-up (Medication).  She is accompanied today by her mother.    HPI  Nuris Andersen is a 23 y.o. female presenting for a medication follow-up, accompanied by her mother. The patient has autism, PCOS, insulin resistance, a history of epilepsy, ADHD and depression. She is currently on Vyvanse 30 mg, 2 capsules per day; and Sertraline 100 mg, 1 tablet twice per day. She has been doing Dayhab through the program [employer]. It is a social activities based program without job training. She has four friends there. She is doing well overall.    She reports macromastia.    I have personally reviewed the [place]AR[place] report for this patient on 1/19/24. I have considered the risks of abuse, dependence, addition, and diversion.      I have reviewed the controlled substance agreement with patient/caregiver on 1/19/24.     Review of Systems  The following history was obtained from patient and mother.   Constitutional: Otherwise denies fever, chills. No difficulties with sleeping, eating, drinking, urine output, or bowel movements.    Eyes, ENT: Denies eye complaints, ear complaints, nasal congestion, runny nose, or sore throat.   Cardio/Resp: Denies chest pain, palpitations, shortness of breath, wheezing, stridor at rest, cough, working hard to breathe, or breathing fast.   GI/Renal: Denies nausea, vomiting, stomachache, diarrhea, or constipation. Denies dysuria or abnormal urine color or smell.   Musculoskeletal/Skin/Chest: Positive macromastia. Denies muscle or joint complaints. Denies skin rash.   Neuro/Psych: Positive ADHD, depression, history of epilepsy, autism. Denies headache, dizziness, confusion.   Endo/heme/lymph: Positive insulin resistance. Denies excessive thirst, excessive sweating, bruising, bleeding, or swollen glands.     Objective   /62   Pulse (!) 119   Ht 1.62 m (5' 3.78\")   Wt 89.8 kg (198 " lb)   BMI 34.22 kg/m²   BSA: 2.01 meters squared  Growth percentiles: Facility age limit for growth %shannon is 20 years. Facility age limit for growth %shannon is 20 years.     Physical Exam  Constitutional: Well developed, well nourished, well hydrated and no acute distress.  Head and Face: Normocephalic, atraumatic.  Inspection and palpation of the face: Normal.  Eyes: Conjunctiva and lids normal.  Ears, Nose, Mouth, and Throat: No nasal discharge. External ears and nose without deformities. TM's normal color, normal landmarks, no fluid, non-retracted. External auditory canals without swelling, redness or tenderness. Pharyngeal mucosa normal. No erythema, exudate, or lesions. Mucous membranes moist. Internal nose without abnormalities.  Neck: No significant cervical adenopathy. Thyroid not enlarged.  Pulmonary: No grunting, flaring or retractions. Clear to auscultation.  Cardiovascular: Regular rate and rhythm. No significant murmur.  Chest: Macromastia.  Abdomen: Soft, non-tender, no masses. No hepatomegaly or splenomegaly.   Skin: No significant rash or lesions.    Problem List Items Addressed This Visit             ICD-10-CM    ADHD (attention deficit hyperactivity disorder) F90.9    Relevant Medications    lisdexamfetamine (Vyvanse) 30 mg capsule (Start on 9/5/2024)    lisdexamfetamine (Vyvanse) 30 mg capsule (Start on 8/8/2024)    lisdexamfetamine (Vyvanse) 30 mg capsule    Recurrent major depressive disorder, in partial remission (CMS-HCC) F33.41    Relevant Medications    sertraline (Zoloft) 100 mg tablet    Well adult exam - Primary Z00.00    Macromastia N62    Relevant Orders    Referral to Breast Surgery     Time in: 8:30 am  Time done: 9:02 am    Assessment/Plan    Nuris is a cheri young woman with autism, PCOS, insulin resistance, a history of epilepsy, ADHD, depression and macromastia.    Her Zoloft 100 mg is good for 6 months.    I refilled her Vyvanse 30 mg, 2 capsules per day. Nuris feels that 1  capsule per day is enough, so she can try that.    She will see Dr. Hamilton about stopping her Trileptal. She has not had any epileptic issues in a long time.    Please remember, you cannot call the pharmacy for a refill of the stimulant, as each prescription is separate onto itself. Please record when you should call for the next prescription to be filled. I have ordered one to be refilled 7/11/24, the next month to be filled 8/8/24, and the third prescription can be filled 9/5/24. These days may change based on restrictions placed by your insurance company. Please call a week before you need the next one after that.     Scribe Attestation  By signing my name below, I, Zoey Davis, attest that this documentation has been prepared under the direction and in the presence of Dr. Rimma Arrington.    Provider Attestation - Scribe documentation  All medical record entries made by the Scribe were at my direction and personally dictated by me. I have reviewed the chart and agree that the record accurately reflects my personal performance of the history, physical exam, discussion and plan.

## 2024-07-11 NOTE — PATIENT INSTRUCTIONS
Nuris is a cheri young woman with autism, PCOS, insulin resistance, a history of epilepsy, ADHD, depression and macromastia.    Her Zoloft 100 mg is good for 6 months.    I refilled her Vyvanse 30 mg, 2 capsules per day. Nuris feels that 1 capsule per day is enough, so she can try that.    She will see Dr. Hamilton about stopping her Trileptal. She has not had any epileptic issues in a long time.    Please remember, you cannot call the pharmacy for a refill of the stimulant, as each prescription is separate onto itself. Please record when you should call for the next prescription to be filled. I have ordered one to be refilled 7/11/24, the next month to be filled 8/8/24, and the third prescription can be filled 9/5/24. These days may change based on restrictions placed by your insurance company. Please call a week before you need the next one after that.

## 2024-07-15 DIAGNOSIS — E28.2 PCOS (POLYCYSTIC OVARIAN SYNDROME): Primary | ICD-10-CM

## 2024-07-16 ENCOUNTER — APPOINTMENT (OUTPATIENT)
Dept: PEDIATRIC ENDOCRINOLOGY | Facility: CLINIC | Age: 24
End: 2024-07-16
Payer: COMMERCIAL

## 2024-07-19 ENCOUNTER — TELEMEDICINE CLINICAL SUPPORT (OUTPATIENT)
Dept: PEDIATRIC ENDOCRINOLOGY | Facility: CLINIC | Age: 24
End: 2024-07-19
Payer: COMMERCIAL

## 2024-07-19 NOTE — PROGRESS NOTES
"Reason for Nutrition Visit:  Pt is a 24 y.o. female being seen for elevated lipids     Past Medical Hx:  Patient Active Problem List   Diagnosis    ADHD (attention deficit hyperactivity disorder)    Allergic rhinitis    Autism ([facility]-HCC)    Depressive disorder    Elevated dehydroepiandrosterone sulfate level    Elevated testosterone level in female    Epilepsy, unspecified, not intractable, without status epilepticus (Multi)    Hyperandrogenism    PCOS (polycystic ovarian syndrome)    Insulin resistance    Seasonal allergies    Recurrent major depressive disorder, in partial remission (CMS-HCC)    Well adult exam    Macromastia      Anthropometrics:         7/11/2024     8:13 AM   Vitals   Systolic 108   Diastolic 62   Heart Rate 119   Height (in) 1.62 m (5' 3.78\")   Weight (lb) 198   BMI 34.22 kg/m2   BSA (m2) 2.01 m2   Visit Report Report      Weight change:  gain of 2.4 kg over about 6 months      Lab Results   Component Value Date    HGBA1C 4.8 07/02/2024    CHOL 284 (H) 07/02/2024    LDLF 119 (H) 05/09/2023    TRIG 203 (H) 07/02/2024      Results for orders placed or performed in visit on 07/02/24   Comprehensive Metabolic Panel   Result Value Ref Range    Glucose 78 74 - 99 mg/dL    Sodium 137 136 - 145 mmol/L    Potassium 4.6 3.5 - 5.3 mmol/L    Chloride 101 98 - 107 mmol/L    Bicarbonate 26 21 - 32 mmol/L    Anion Gap 15 10 - 20 mmol/L    Urea Nitrogen 9 6 - 23 mg/dL    Creatinine 0.61 0.50 - 1.05 mg/dL    eGFR >90 >60 mL/min/1.73m*2    Calcium 9.3 8.6 - 10.6 mg/dL    Albumin 4.4 3.4 - 5.0 g/dL    Alkaline Phosphatase 65 33 - 110 U/L    Total Protein 7.0 6.4 - 8.2 g/dL    AST 19 9 - 39 U/L    Bilirubin, Total 0.2 0.0 - 1.2 mg/dL    ALT 9 7 - 45 U/L   Hemoglobin A1C   Result Value Ref Range    Hemoglobin A1C 4.8 see below %    Estimated Average Glucose 91 Not Established mg/dL   Lipid Panel   Result Value Ref Range    Cholesterol 284 (H) 0 - 199 mg/dL    HDL-Cholesterol 75.6 mg/dL    Cholesterol/HDL Ratio 3.8  "    LDL Calculated 168 (H) <=119 mg/dL    VLDL 41 (H) 0 - 40 mg/dL    Triglycerides 203 (H) 0 - 149 mg/dL    Non HDL Cholesterol 208 (H) 0 - 149 mg/dL   Testosterone,Free and Total   Result Value Ref Range    Testosterone, Free 2.3 0.1 - 6.4 pg/mL    Testosterone, Total, LC-MS/MS 50 (H) 2 - 45 ng/dL   DHEA-Sulfate   Result Value Ref Range    DHEA Sulfate 479 (H) 65 - 395 ug/dL   Human Chorionic Gonadotropin   Result Value Ref Range    HCG, Beta-Quantitative <3 <5 mIU/mL   Phosphorus   Result Value Ref Range    Phosphorus 4.8 2.5 - 4.9 mg/dL     Medications:   Current Outpatient Medications on File Prior to Visit   Medication Sig Dispense Refill    cetirizine (ZyrTEC) 10 mg tablet Take by mouth.      [START ON 9/5/2024] lisdexamfetamine (Vyvanse) 30 mg capsule Take 2 capsules (60 mg) by mouth once daily in the morning. Do not fill before September 5, 2024. 60 capsule 0    [START ON 8/8/2024] lisdexamfetamine (Vyvanse) 30 mg capsule Take 2 capsules (60 mg) by mouth once daily in the morning. Do not fill before August 8, 2024. 60 capsule 0    lisdexamfetamine (Vyvanse) 30 mg capsule Take 2 capsules (60 mg) by mouth once daily in the morning. 60 capsule 0    lisdexamfetamine (Vyvanse) 60 mg capsule Take 1 capsule (60 mg) by mouth once daily in the morning. 30 capsule 0    metFORMIN (Glucophage) 500 mg tablet Take 2 tablets (1,000 mg) by mouth 2 times a day. Take with food. 120 tablet 11    norgestimate-ethinyl estradioL (Ortho-Cyclen) 0.25-35 mg-mcg tablet Take 1 tablet by mouth once daily. 28 tablet 1    OXcarbazepine (Trileptal) 300 mg tablet take 1 AND 1/2 tablets by mouth every morning and 2 tablets by mouth every evening 105 tablet 5    sertraline (Zoloft) 100 mg tablet Take 1 tablet (100 mg) by mouth 2 times a day. TAKE 1 TABLET BY MOUTH IN THE MORNING AND 1 TABLET BEFORE BEDTIME 180 tablet 1    spironolactone (Aldactone) 50 mg tablet Take 1 tablet (50 mg) by mouth once daily. 30 tablet 0     No current  facility-administered medications on file prior to visit.      24 Diet Recall:  Meal 1:  B - cereal - Fruit Loops/ HN Cheerios + milk - lowfat OR  2 toast - ham + fruit - apples/ strawb / banana    Meal 2:  L - sandwich - white + turkey or bologna + chips (Kettle/ Cheese It)  + fruit + cookies (chips Ahoy)  Meal 3:  D (5-7) - hamburger + chips + fruit + apple juice or water   Snacks:  dessert - small - eat 1/2 - scar crossiant // cookies // cakes// rare string cheese  (decreased sweet)   Beverages:  water + juice - Cranberry + milk - sometimes (stopped soda)   Dairy - yogurt - Reg // no butter // not a lot cheese   Protein - bologna + turkey + chicken + ham + steak   No nuts, no nut butter, no avocado, no hummus (not a lot of dips)   Vegetables = carrots, green beans, tomato sauce, broccoli     Activity:  Dayhab - increase social skills - explore [place] - different city + walking     Allergies   Allergen Reactions    Pollen Extracts Runny nose     Estimated Energy Needs: 2000 calories/day     Nutrition Diagnosis:    Diagnosis Statement 1:  Diagnosis Status: New  Diagnosis : Altered nutrition related lab values  related to  elevated lipid profile  as evidenced by lab work     Nutrition Goals:  Patient states she does not like the foods that are sources of unsaturated fats.  Encouraged patient to concentrate on a lower fat diet.  Limit bologna to 2 x a week.  Defined goals - 25% of calories from total fat and 7-10% of calories from saturated fat sources.    Defined sources of unsaturated and saturated fats.    Plan to add at least 1 source of unsaturated fat in the diet daily.  Emailed handouts on Understanding Your Lipid profile.

## 2024-08-07 DIAGNOSIS — F33.41 RECURRENT MAJOR DEPRESSIVE DISORDER, IN PARTIAL REMISSION (CMS-HCC): ICD-10-CM

## 2024-08-07 RX ORDER — SERTRALINE HYDROCHLORIDE 100 MG/1
100 TABLET, FILM COATED ORAL 2 TIMES DAILY
Qty: 60 TABLET | Refills: 3 | Status: SHIPPED | OUTPATIENT
Start: 2024-08-07

## 2024-08-08 ENCOUNTER — LAB (OUTPATIENT)
Dept: LAB | Facility: LAB | Age: 24
End: 2024-08-08
Payer: COMMERCIAL

## 2024-08-08 DIAGNOSIS — E28.2 PCOS (POLYCYSTIC OVARIAN SYNDROME): ICD-10-CM

## 2024-08-08 PROCEDURE — 80069 RENAL FUNCTION PANEL: CPT

## 2024-08-08 PROCEDURE — 36415 COLL VENOUS BLD VENIPUNCTURE: CPT

## 2024-08-09 DIAGNOSIS — E28.2 PCOS (POLYCYSTIC OVARIAN SYNDROME): Primary | ICD-10-CM

## 2024-08-09 LAB
ALBUMIN SERPL BCP-MCNC: 5.2 G/DL (ref 3.4–5)
ANION GAP SERPL CALC-SCNC: 22 MMOL/L (ref 10–20)
BUN SERPL-MCNC: 17 MG/DL (ref 6–23)
CALCIUM SERPL-MCNC: 10.1 MG/DL (ref 8.6–10.6)
CHLORIDE SERPL-SCNC: 102 MMOL/L (ref 98–107)
CO2 SERPL-SCNC: 19 MMOL/L (ref 21–32)
CREAT SERPL-MCNC: 0.72 MG/DL (ref 0.5–1.05)
EGFRCR SERPLBLD CKD-EPI 2021: >90 ML/MIN/1.73M*2
GLUCOSE SERPL-MCNC: 73 MG/DL (ref 74–99)
PHOSPHATE SERPL-MCNC: 5.8 MG/DL (ref 2.5–4.9)
POTASSIUM SERPL-SCNC: 5.4 MMOL/L (ref 3.5–5.3)
SODIUM SERPL-SCNC: 138 MMOL/L (ref 136–145)

## 2024-08-09 NOTE — RESULT ENCOUNTER NOTE
Sadiq or Rosemary, can you call Nuris with this plan?    Lab test results show:   - potassium is a little bit high after starting sprionolactone. Probably this is because of hemolysis, but cannot exclude an actual high potassium.     Recommendation:  - I recommend they go get labs done again when she is well hydrated, within the next week.     Will ask endo RN to call

## 2024-08-19 ENCOUNTER — TELEPHONE (OUTPATIENT)
Dept: PEDIATRIC ENDOCRINOLOGY | Facility: CLINIC | Age: 24
End: 2024-08-19
Payer: COMMERCIAL

## 2024-08-19 DIAGNOSIS — E28.2 PCOS (POLYCYSTIC OVARIAN SYNDROME): ICD-10-CM

## 2024-08-19 DIAGNOSIS — E28.2 PCOS (POLYCYSTIC OVARIAN SYNDROME): Primary | ICD-10-CM

## 2024-08-19 RX ORDER — SPIRONOLACTONE 50 MG/1
50 TABLET, FILM COATED ORAL DAILY
Qty: 30 TABLET | Refills: 0 | Status: SHIPPED | OUTPATIENT
Start: 2024-08-19 | End: 2025-08-19

## 2024-08-19 NOTE — TELEPHONE ENCOUNTER
Will re-start spironolactone and get labs one week later to re-check potassium.         ----- Message from Nurse Sadiq ASHLEY sent at 8/12/2024  1:01 PM EDT -----  Zita, she was out of spironolactone for about 5 days when her labs were done and out off since then.  Restart and recheck labs week or so later?  Pls advise  ----- Message -----  From: Zita Aguilar MD  Sent: 8/9/2024   3:45 PM EDT  To: VINOD Belle or Rosemary, can you call Nuris with this plan?    Lab test results show:   - potassium is a little bit high after starting sprionolactone. Probably this is because of hemolysis, but cannot exclude an actual high potassium.     Recommendation:  - I recommend they go get labs done again when she is well hydrated, within the next week.     Will ask gisele RN to call

## 2024-08-22 DIAGNOSIS — E28.2 PCOS (POLYCYSTIC OVARIAN SYNDROME): ICD-10-CM

## 2024-09-03 ENCOUNTER — APPOINTMENT (OUTPATIENT)
Dept: PEDIATRIC ENDOCRINOLOGY | Facility: CLINIC | Age: 24
End: 2024-09-03
Payer: COMMERCIAL

## 2024-09-10 ENCOUNTER — LAB (OUTPATIENT)
Dept: LAB | Facility: LAB | Age: 24
End: 2024-09-10
Payer: COMMERCIAL

## 2024-09-10 DIAGNOSIS — E28.2 PCOS (POLYCYSTIC OVARIAN SYNDROME): ICD-10-CM

## 2024-09-10 PROCEDURE — 80069 RENAL FUNCTION PANEL: CPT

## 2024-09-10 PROCEDURE — 36415 COLL VENOUS BLD VENIPUNCTURE: CPT

## 2024-09-11 DIAGNOSIS — E28.2 PCOS (POLYCYSTIC OVARIAN SYNDROME): ICD-10-CM

## 2024-09-11 LAB
ALBUMIN SERPL BCP-MCNC: 4.9 G/DL (ref 3.4–5)
ANION GAP SERPL CALC-SCNC: 17 MMOL/L (ref 10–20)
BUN SERPL-MCNC: 13 MG/DL (ref 6–23)
CALCIUM SERPL-MCNC: 9.9 MG/DL (ref 8.6–10.6)
CHLORIDE SERPL-SCNC: 102 MMOL/L (ref 98–107)
CO2 SERPL-SCNC: 25 MMOL/L (ref 21–32)
CREAT SERPL-MCNC: 0.62 MG/DL (ref 0.5–1.05)
EGFRCR SERPLBLD CKD-EPI 2021: >90 ML/MIN/1.73M*2
GLUCOSE SERPL-MCNC: 87 MG/DL (ref 74–99)
PHOSPHATE SERPL-MCNC: 4.6 MG/DL (ref 2.5–4.9)
POTASSIUM SERPL-SCNC: 4.1 MMOL/L (ref 3.5–5.3)
SODIUM SERPL-SCNC: 140 MMOL/L (ref 136–145)

## 2024-09-11 RX ORDER — SPIRONOLACTONE 50 MG/1
50 TABLET, FILM COATED ORAL DAILY
Qty: 30 TABLET | Refills: 11 | Status: SHIPPED | OUTPATIENT
Start: 2024-09-11 | End: 2025-09-11

## 2024-10-07 DIAGNOSIS — E28.2 PCOS (POLYCYSTIC OVARIAN SYNDROME): ICD-10-CM

## 2024-10-07 RX ORDER — NORGESTIMATE AND ETHINYL ESTRADIOL 0.25-0.035
1 KIT ORAL DAILY
Qty: 28 TABLET | Refills: 5 | Status: SHIPPED | OUTPATIENT
Start: 2024-10-07

## 2024-11-15 DIAGNOSIS — F90.2 ATTENTION DEFICIT HYPERACTIVITY DISORDER (ADHD), COMBINED TYPE: ICD-10-CM

## 2024-11-15 RX ORDER — LISDEXAMFETAMINE DIMESYLATE 30 MG/1
60 CAPSULE ORAL EVERY MORNING
Qty: 60 CAPSULE | Refills: 0 | Status: SHIPPED | OUTPATIENT
Start: 2024-11-15 | End: 2024-12-15

## 2024-12-04 DIAGNOSIS — G40.909 SEIZURE DISORDER (MULTI): ICD-10-CM

## 2024-12-04 RX ORDER — OXCARBAZEPINE 300 MG/1
TABLET, FILM COATED ORAL
Qty: 105 TABLET | Refills: 5 | Status: SHIPPED | OUTPATIENT
Start: 2024-12-04

## 2024-12-24 DIAGNOSIS — F90.2 ATTENTION DEFICIT HYPERACTIVITY DISORDER (ADHD), COMBINED TYPE: ICD-10-CM

## 2024-12-24 RX ORDER — LISDEXAMFETAMINE DIMESYLATE 30 MG/1
60 CAPSULE ORAL EVERY MORNING
Qty: 60 CAPSULE | Refills: 0 | Status: SHIPPED | OUTPATIENT
Start: 2024-12-24 | End: 2025-01-23

## 2025-01-07 ENCOUNTER — APPOINTMENT (OUTPATIENT)
Dept: PEDIATRICS | Facility: CLINIC | Age: 25
End: 2025-01-07
Payer: COMMERCIAL

## 2025-01-30 ENCOUNTER — APPOINTMENT (OUTPATIENT)
Dept: PEDIATRICS | Facility: CLINIC | Age: 25
End: 2025-01-30
Payer: COMMERCIAL

## 2025-01-30 VITALS
HEART RATE: 100 BPM | DIASTOLIC BLOOD PRESSURE: 70 MMHG | TEMPERATURE: 98.2 F | WEIGHT: 191.5 LBS | RESPIRATION RATE: 20 BRPM | BODY MASS INDEX: 32.69 KG/M2 | HEIGHT: 64 IN | SYSTOLIC BLOOD PRESSURE: 118 MMHG | OXYGEN SATURATION: 96 %

## 2025-01-30 DIAGNOSIS — J06.9 VIRAL UPPER RESPIRATORY TRACT INFECTION: ICD-10-CM

## 2025-01-30 DIAGNOSIS — F33.41 RECURRENT MAJOR DEPRESSIVE DISORDER, IN PARTIAL REMISSION (CMS-HCC): ICD-10-CM

## 2025-01-30 DIAGNOSIS — F84.0 AUTISM (HHS-HCC): ICD-10-CM

## 2025-01-30 DIAGNOSIS — F90.2 ATTENTION DEFICIT HYPERACTIVITY DISORDER (ADHD), COMBINED TYPE: Primary | ICD-10-CM

## 2025-01-30 PROCEDURE — 99214 OFFICE O/P EST MOD 30 MIN: CPT | Performed by: PEDIATRICS

## 2025-01-30 PROCEDURE — 1036F TOBACCO NON-USER: CPT | Performed by: PEDIATRICS

## 2025-01-30 PROCEDURE — 3008F BODY MASS INDEX DOCD: CPT | Performed by: PEDIATRICS

## 2025-01-30 RX ORDER — SERTRALINE HYDROCHLORIDE 100 MG/1
100 TABLET, FILM COATED ORAL 2 TIMES DAILY
Qty: 60 TABLET | Refills: 5 | Status: SHIPPED | OUTPATIENT
Start: 2025-01-30 | End: 2025-07-29

## 2025-01-30 RX ORDER — LISDEXAMFETAMINE DIMESYLATE 30 MG/1
60 CAPSULE ORAL EVERY MORNING
Qty: 60 CAPSULE | Refills: 0 | Status: SHIPPED | OUTPATIENT
Start: 2025-01-30 | End: 2025-03-01

## 2025-01-30 RX ORDER — LISDEXAMFETAMINE DIMESYLATE 30 MG/1
60 CAPSULE ORAL EVERY MORNING
Qty: 60 CAPSULE | Refills: 0 | Status: SHIPPED | OUTPATIENT
Start: 2025-03-27 | End: 2025-04-26

## 2025-01-30 RX ORDER — LISDEXAMFETAMINE DIMESYLATE 30 MG/1
60 CAPSULE ORAL EVERY MORNING
Qty: 60 CAPSULE | Refills: 0 | Status: SHIPPED | OUTPATIENT
Start: 2025-02-27 | End: 2025-03-29

## 2025-01-30 ASSESSMENT — ANXIETY QUESTIONNAIRES
5. BEING SO RESTLESS THAT IT IS HARD TO SIT STILL: NOT AT ALL
IF YOU CHECKED OFF ANY PROBLEMS ON THIS QUESTIONNAIRE, HOW DIFFICULT HAVE THESE PROBLEMS MADE IT FOR YOU TO DO YOUR WORK, TAKE CARE OF THINGS AT HOME, OR GET ALONG WITH OTHER PEOPLE: NOT DIFFICULT AT ALL
5. BEING SO RESTLESS THAT IT IS HARD TO SIT STILL: NOT AT ALL
6. BECOMING EASILY ANNOYED OR IRRITABLE: SEVERAL DAYS
3. WORRYING TOO MUCH ABOUT DIFFERENT THINGS: NOT AT ALL
6. BECOMING EASILY ANNOYED OR IRRITABLE: SEVERAL DAYS
GAD7 TOTAL SCORE: 1
7. FEELING AFRAID AS IF SOMETHING AWFUL MIGHT HAPPEN: NOT AT ALL
1. FEELING NERVOUS, ANXIOUS, OR ON EDGE: NOT AT ALL
1. FEELING NERVOUS, ANXIOUS, OR ON EDGE: NOT AT ALL
2. NOT BEING ABLE TO STOP OR CONTROL WORRYING: NOT AT ALL
4. TROUBLE RELAXING: NOT AT ALL
7. FEELING AFRAID AS IF SOMETHING AWFUL MIGHT HAPPEN: NOT AT ALL
4. TROUBLE RELAXING: NOT AT ALL
2. NOT BEING ABLE TO STOP OR CONTROL WORRYING: NOT AT ALL
3. WORRYING TOO MUCH ABOUT DIFFERENT THINGS: NOT AT ALL
IF YOU CHECKED OFF ANY PROBLEMS ON THIS QUESTIONNAIRE, HOW DIFFICULT HAVE THESE PROBLEMS MADE IT FOR YOU TO DO YOUR WORK, TAKE CARE OF THINGS AT HOME, OR GET ALONG WITH OTHER PEOPLE: NOT DIFFICULT AT ALL

## 2025-01-30 ASSESSMENT — PATIENT HEALTH QUESTIONNAIRE - PHQ9
8. MOVING OR SPEAKING SO SLOWLY THAT OTHER PEOPLE COULD HAVE NOTICED. OR THE OPPOSITE - BEING SO FIDGETY OR RESTLESS THAT YOU HAVE BEEN MOVING AROUND A LOT MORE THAN USUAL: NOT AT ALL
1. LITTLE INTEREST OR PLEASURE IN DOING THINGS: NOT AT ALL
7. TROUBLE CONCENTRATING ON THINGS, SUCH AS READING THE NEWSPAPER OR WATCHING TELEVISION: NOT AT ALL
6. FEELING BAD ABOUT YOURSELF - OR THAT YOU ARE A FAILURE OR HAVE LET YOURSELF OR YOUR FAMILY DOWN: NOT AT ALL
4. FEELING TIRED OR HAVING LITTLE ENERGY: SEVERAL DAYS
10. IF YOU CHECKED OFF ANY PROBLEMS, HOW DIFFICULT HAVE THESE PROBLEMS MADE IT FOR YOU TO DO YOUR WORK, TAKE CARE OF THINGS AT HOME, OR GET ALONG WITH OTHER PEOPLE: NOT DIFFICULT AT ALL
3. TROUBLE FALLING OR STAYING ASLEEP OR SLEEPING TOO MUCH: SEVERAL DAYS
5. POOR APPETITE OR OVEREATING: NOT AT ALL
4. FEELING TIRED OR HAVING LITTLE ENERGY: SEVERAL DAYS
3. TROUBLE FALLING OR STAYING ASLEEP: SEVERAL DAYS
1. LITTLE INTEREST OR PLEASURE IN DOING THINGS: NOT AT ALL
7. TROUBLE CONCENTRATING ON THINGS, SUCH AS READING THE NEWSPAPER OR WATCHING TELEVISION: NOT AT ALL
2. FEELING DOWN, DEPRESSED OR HOPELESS: NOT AT ALL
5. POOR APPETITE OR OVEREATING: NOT AT ALL
10. IF YOU CHECKED OFF ANY PROBLEMS, HOW DIFFICULT HAVE THESE PROBLEMS MADE IT FOR YOU TO DO YOUR WORK, TAKE CARE OF THINGS AT HOME, OR GET ALONG WITH OTHER PEOPLE: NOT DIFFICULT AT ALL
9. THOUGHTS THAT YOU WOULD BE BETTER OFF DEAD, OR OF HURTING YOURSELF: NOT AT ALL
8. MOVING OR SPEAKING SO SLOWLY THAT OTHER PEOPLE COULD HAVE NOTICED. OR THE OPPOSITE, BEING SO FIGETY OR RESTLESS THAT YOU HAVE BEEN MOVING AROUND A LOT MORE THAN USUAL: NOT AT ALL
SUM OF ALL RESPONSES TO PHQ QUESTIONS 1-9: 2
6. FEELING BAD ABOUT YOURSELF - OR THAT YOU ARE A FAILURE OR HAVE LET YOURSELF OR YOUR FAMILY DOWN: NOT AT ALL
SUM OF ALL RESPONSES TO PHQ9 QUESTIONS 1 & 2: 0
2. FEELING DOWN, DEPRESSED OR HOPELESS: NOT AT ALL
9. THOUGHTS THAT YOU WOULD BE BETTER OFF DEAD, OR OF HURTING YOURSELF: NOT AT ALL

## 2025-01-30 NOTE — PROGRESS NOTES
Subjective   Patient ID: Nuris Andersen is a 24 y.o. female, otherwise healthy, who presents for Follow-up (Follow up on meds, cough, stuffy nose for 4 days ).    HPI  Nuris Andersen is a 24 y.o. female presenting for a medication follow-up, accompanied by her mother. Medical, medication and allergy histories were reviewed. She has autism, PCOS, insulin resistance, a history of epilepsy, ADHD and depression. She is currently on Vyvanse 60 mg per day; and Zoloft 100 mg twice per day. She has been doing well. She has a new therapist. She attends [employer].    3 days ago, the patient developed nasal congestion and cough. She had a headache for 1 day 3 days ago.     ASQ was a No for questions 1 through 4 and a No for question 5.    The PHQ-9A is 2.  The severity measure for depression age 11-17, PHQ-9 modified for adolescence scoring results are as follows: 0-4 = none, 5-9 = mild, 10-14 = moderate, 15-19 = moderately severe, and 20-27 = severe.    The PASCUAL-7 is 1.  The scoring scale is as follows: 0-5 is minimal anxiety, 6-10 is mild anxiety, 11-15 is moderate anxiety, and 16-21 is severe anxiety. A score greater than 7 is clinically significant.     I have personally reviewed the OARRS report for this patient on 1/30/25. I have considered the risks of abuse, dependence, addition, and diversion.      I have reviewed the controlled substance agreement with patient/caregiver on 1/30/25.     Review of Systems  The following history was obtained from patient and mother.   Constitutional: Otherwise denies fever, chills. No difficulties with sleeping, eating, drinking, urine output, or bowel movements.    Eyes, ENT: Positive nasal congestion. Denies eye complaints, ear complaints, runny nose, or sore throat.   Cardio/Resp: Positive cough. Denies chest pain, palpitations, shortness of breath, wheezing, stridor at rest, working hard to breathe, or breathing fast.   GI/Renal: Denies nausea, vomiting, stomachache,  diarrhea, or constipation. Denies dysuria or abnormal urine color or smell.   Musculoskeletal/Skin: Denies muscle or joint complaints. Denies skin rash.   Neuro/Psych: Positive headache (resolved), ADHD, depression. Denies dizziness, confusion, irritability, or fussiness.   Endo/heme/lymph: Denies excessive thirst, excessive sweating, bruising, bleeding, or swollen glands.     Current Outpatient Medications   Medication Sig Dispense Refill    cetirizine (ZyrTEC) 10 mg tablet Take by mouth.      [START ON 3/27/2025] lisdexamfetamine (Vyvanse) 30 mg capsule Take 2 capsules (60 mg) by mouth once daily in the morning. Do not fill before March 27, 2025. 60 capsule 0    [START ON 2/27/2025] lisdexamfetamine (Vyvanse) 30 mg capsule Take 2 capsules (60 mg) by mouth once daily in the morning. Do not fill before February 27, 2025. 60 capsule 0    lisdexamfetamine (Vyvanse) 30 mg capsule Take 2 capsules (60 mg) by mouth once daily in the morning. 60 capsule 0    metFORMIN (Glucophage) 500 mg tablet Take 2 tablets (1,000 mg) by mouth 2 times a day. Take with food. 120 tablet 11    norgestimate-ethinyl estradioL (Ortho-Cyclen) 0.25-35 mg-mcg tablet Take 1 tablet by mouth once daily. 28 tablet 5    OXcarbazepine (Trileptal) 300 mg tablet TAKE 1 AND 1/2 TABLETS BY MOUTH EVERY MORNING AND 2 TABS EVERY EVENING 105 tablet 5    sertraline (Zoloft) 100 mg tablet Take 1 tablet (100 mg) by mouth 2 times a day. 60 tablet 5    spironolactone (Aldactone) 50 mg tablet Take 1 tablet (50 mg) by mouth once daily. 30 tablet 11     No current facility-administered medications for this visit.        Allergies   Allergen Reactions    Pollen Extracts Runny nose        Family History   Problem Relation Name Age of Onset    Depression Father Leonardo Andersen     Cancer Maternal Grandfather Amos Ware     Cancer Maternal Grandmother Josee Manuel     Asthma Brother Win Ganesh     Depression Brother Win Ganesh         Objective   /70  "  Pulse 100   Temp 36.8 °C (98.2 °F)   Resp 20   Ht 1.615 m (5' 3.58\")   Wt 86.9 kg (191 lb 8 oz)   SpO2 96%   BMI 33.30 kg/m²   BSA: 1.97 meters squared  Growth percentiles: Facility age limit for growth %shannon is 20 years. Facility age limit for growth %shannon is 20 years.     Physical Exam  Constitutional: Well developed, well nourished, well hydrated and no acute distress.  HENT:      Head: Normocephalic, atraumatic, normal palpation and inspection of face.      Ears: External ears normal and without deformities. Normal TMs.       Nose: Nose normal, patent nares and without deformities.      Mouth/Throat: Mucous membranes are moist. Normal palate. Oropharynx is clear.  Eyes: Conjunctiva and lids normal.  Neck: No significant cervical adenopathy. Thyroid not enlarged.  Pulmonary: No grunting, flaring or retractions. Clear to auscultation.  Cardiovascular: Regular rate and rhythm. No significant murmur.  Chest: Normal without deformity.  Abdomen: Soft, non-tender, no masses. No hepatomegaly or splenomegaly.   Skin: No significant rash or lesions.    Problem List Items Addressed This Visit             ICD-10-CM    ADHD (attention deficit hyperactivity disorder) - Primary F90.9    Relevant Medications    lisdexamfetamine (Vyvanse) 30 mg capsule (Start on 3/27/2025)    lisdexamfetamine (Vyvanse) 30 mg capsule (Start on 2/27/2025)    lisdexamfetamine (Vyvanse) 30 mg capsule    Autism ([facility]-HCC) F84.0    Recurrent major depressive disorder, in partial remission (CMS-HCC) F33.41    Relevant Medications    sertraline (Zoloft) 100 mg tablet    Viral upper respiratory tract infection J06.9     Time in: 12:08 pm  Time done: 12:27 pm    Assessment/Plan    Nuris presents for a medication follow-up. She has autism, PCOS, insulin resistance, a history of epilepsy, ADHD and depression. She is currently on Vyvanse 60 mg per day; and Zoloft 100 mg twice per day.    Her medications are good for 6 months.    Please remember, " you cannot call the pharmacy for a refill of the stimulant, as each prescription is separate onto itself. Please record when you should call for the next prescription to be filled. I have ordered one to be refilled today, the next month to be filled 2/27/25, and the third prescription can be filled 3/27/25. These days may change based on restrictions placed by your insurance company. Please call a week before you need the next one after that. We gave printed scripts.    Your child has an upper respiratory tract infection, or cold. If your child is not better by 2/5/25, please call, and we can prescribe an antibiotic for a sinus infection. If your child requires an antibiotic for sinus infection, we will prescribe Augmentin 875 mg, and your child's dose is 1 tablet(s) twice a day for 10 days. If your child starts to have diarrhea, I would recommend strongly giving your child acidophilus. You can give this to him/her as Culturelle, Florastor, Align or other types. I would give your child a one-unit dose 2 hours after giving the antibiotic. If you give it at the same time as the antibiotic, there will be decreased effectiveness of the antibiotic. Ask the pharmacist what one-unit dose for your child would be. Probiotics are not controlled by the FDA, so there is no unified dosing. Call if your child gets worse.      Your child has a cold. Colds can last up to 2 weeks and do not need antibiotics, as they are caused by a virus. There are things you can do to help a cold get better faster.      #1 Hydrating your child will help a lot. For example, for an older child, 4-6 of the 16-ounce water bottles per day will help flush the virus from the system. Make sure your child is urinating once every 8 hours if they are older than one year old, and once every 6 hours if they are under the age of 1.      #2 Nasal saline washes will also clear the sinuses and not allow the mucous to get infected. Recipe to make own nasal saline  solution: Mix 8 oz of tap water (be sure to boil it then let it cool down) or distilled water with 1/2 tsp of baking soda and 1/2 tsp of table salt and shake bottle to dissolve.      #3 Cool mist humidifier in the bedroom at night will help thin out the mucus as well. Just make sure it is cleaned regularly, or you may be putting yeast spores into the environment. Near the humidifier equipment in all drugstores, you can find small balls that you put into the water of a cool mist humidifier, and it prevents growth of anything or the sliminess for up to a month. One brand is Protect.      #4 Vitamin and zinc supplements may also help to some degree. Please give zinc on a full stomach, as sometimes it can make children nauseous. Children from 1-6 years old may have 25 mg of zinc per day. Older than that may have 50 mg per day.      Scribe Attestation  By signing my name below, I, Zoey Davis, attest that this documentation has been prepared under the direction and in the presence of Dr. Rimma Arrington.    Provider Attestation - Scribe documentation  All medical record entries made by the Scribe were at my direction and personally dictated by me. I have reviewed the chart and agree that the record accurately reflects my personal performance of the history, physical exam, discussion and plan.

## 2025-01-30 NOTE — PATIENT INSTRUCTIONS
Nuris presents for a medication follow-up. She has autism, PCOS, insulin resistance, a history of epilepsy, ADHD and depression. She is currently on Vyvanse 60 mg per day; and Zoloft 100 mg twice per day.    Her medications are good for 6 months.    Please remember, you cannot call the pharmacy for a refill of the stimulant, as each prescription is separate onto itself. Please record when you should call for the next prescription to be filled. I have ordered one to be refilled today, the next month to be filled 2/27/25, and the third prescription can be filled 3/27/25. These days may change based on restrictions placed by your insurance company. Please call a week before you need the next one after that. We gave printed scripts.    Your child has an upper respiratory tract infection, or cold. If your child is not better by 2/5/25, please call, and we can prescribe an antibiotic for a sinus infection. If your child requires an antibiotic for sinus infection, we will prescribe Augmentin 875 mg, and your child's dose is 1 tablet(s) twice a day for 10 days. If your child starts to have diarrhea, I would recommend strongly giving your child acidophilus. You can give this to him/her as Culturelle, Florastor, Align or other types. I would give your child a one-unit dose 2 hours after giving the antibiotic. If you give it at the same time as the antibiotic, there will be decreased effectiveness of the antibiotic. Ask the pharmacist what one-unit dose for your child would be. Probiotics are not controlled by the FDA, so there is no unified dosing. Call if your child gets worse.      Your child has a cold. Colds can last up to 2 weeks and do not need antibiotics, as they are caused by a virus. There are things you can do to help a cold get better faster.      #1 Hydrating your child will help a lot. For example, for an older child, 4-6 of the 16-ounce water bottles per day will help flush the virus from the system. Make  sure your child is urinating once every 8 hours if they are older than one year old, and once every 6 hours if they are under the age of 1.      #2 Nasal saline washes will also clear the sinuses and not allow the mucous to get infected. Recipe to make own nasal saline solution: Mix 8 oz of tap water (be sure to boil it then let it cool down) or distilled water with 1/2 tsp of baking soda and 1/2 tsp of table salt and shake bottle to dissolve.      #3 Cool mist humidifier in the bedroom at night will help thin out the mucus as well. Just make sure it is cleaned regularly, or you may be putting yeast spores into the environment. Near the humidifier equipment in all drugstores, you can find small balls that you put into the water of a cool mist humidifier, and it prevents growth of anything or the sliminess for up to a month. One brand is Protect.      #4 Vitamin and zinc supplements may also help to some degree. Please give zinc on a full stomach, as sometimes it can make children nauseous. Children from 1-6 years old may have 25 mg of zinc per day. Older than that may have 50 mg per day.

## 2025-03-27 DIAGNOSIS — E28.2 PCOS (POLYCYSTIC OVARIAN SYNDROME): ICD-10-CM

## 2025-03-27 RX ORDER — NORGESTIMATE AND ETHINYL ESTRADIOL 0.25-0.035
1 KIT ORAL DAILY
Qty: 28 TABLET | Refills: 2 | Status: SHIPPED | OUTPATIENT
Start: 2025-03-27

## 2025-04-07 ENCOUNTER — OFFICE VISIT (OUTPATIENT)
Dept: PEDIATRICS | Facility: CLINIC | Age: 25
End: 2025-04-07
Payer: COMMERCIAL

## 2025-04-07 VITALS
HEIGHT: 63 IN | HEART RATE: 121 BPM | OXYGEN SATURATION: 95 % | WEIGHT: 188.2 LBS | BODY MASS INDEX: 33.35 KG/M2 | TEMPERATURE: 98.4 F

## 2025-04-07 DIAGNOSIS — R06.00 DYSPNEA, UNSPECIFIED TYPE: ICD-10-CM

## 2025-04-07 DIAGNOSIS — J98.8 WHEEZING-ASSOCIATED RESPIRATORY INFECTION: Primary | ICD-10-CM

## 2025-04-07 PROCEDURE — 94640 AIRWAY INHALATION TREATMENT: CPT | Performed by: PEDIATRICS

## 2025-04-07 PROCEDURE — 99214 OFFICE O/P EST MOD 30 MIN: CPT | Performed by: PEDIATRICS

## 2025-04-07 PROCEDURE — 3008F BODY MASS INDEX DOCD: CPT | Performed by: PEDIATRICS

## 2025-04-07 PROCEDURE — 1036F TOBACCO NON-USER: CPT | Performed by: PEDIATRICS

## 2025-04-07 RX ORDER — ALBUTEROL SULFATE 90 UG/1
2 INHALANT RESPIRATORY (INHALATION) EVERY 4 HOURS PRN
Qty: 18 G | Refills: 0 | Status: SHIPPED | OUTPATIENT
Start: 2025-04-07 | End: 2026-04-07

## 2025-04-07 RX ORDER — AZITHROMYCIN 200 MG/5ML
POWDER, FOR SUSPENSION ORAL
Qty: 37.7 ML | Refills: 0 | Status: SHIPPED | OUTPATIENT
Start: 2025-04-07 | End: 2025-04-12

## 2025-04-07 RX ORDER — INHALER, ASSIST DEVICES
SPACER (EA) MISCELLANEOUS
Qty: 1 EACH | Refills: 0 | Status: SHIPPED | OUTPATIENT
Start: 2025-04-07 | End: 2026-04-07

## 2025-04-07 RX ORDER — ALBUTEROL SULFATE 0.83 MG/ML
3 SOLUTION RESPIRATORY (INHALATION) ONCE
Status: COMPLETED | OUTPATIENT
Start: 2025-04-07 | End: 2025-04-07

## 2025-04-07 RX ADMIN — Medication 12 MG: at 11:35

## 2025-04-07 RX ADMIN — ALBUTEROL SULFATE 3 ML: 0.83 SOLUTION RESPIRATORY (INHALATION) at 11:36

## 2025-04-07 ASSESSMENT — ENCOUNTER SYMPTOMS
FEVER: 0
APPETITE CHANGE: 0
SHORTNESS OF BREATH: 1
FATIGUE: 1
MYALGIAS: 0
CHEST TIGHTNESS: 1
RHINORRHEA: 1
VOMITING: 0
ABDOMINAL PAIN: 0
SORE THROAT: 1
NAUSEA: 0
COUGH: 1
DIZZINESS: 1
EYE REDNESS: 0
HEADACHES: 0
DIARRHEA: 1
EYE DISCHARGE: 0

## 2025-04-07 NOTE — PROGRESS NOTES
"Subjective   Patient ID: Nuris Andersen is a 24 y.o. female with complex past medical history who presents for Cough (Cough, sore throat, trouble breathing ).  She is accompanied today by her mother.    HPI:  Nuris developed a sore throat starting 2 days ago, followed by a cough and difficulty breathing.   She was awake through the night with cough and dyspnea.  No prior history of wheezing.                  Review of Systems   Constitutional:  Positive for fatigue. Negative for appetite change and fever.   HENT:  Positive for congestion, rhinorrhea and sore throat. Negative for ear pain.    Eyes:  Negative for discharge and redness.   Respiratory:  Positive for cough, chest tightness and shortness of breath.    Gastrointestinal:  Positive for diarrhea. Negative for abdominal pain, nausea and vomiting.   Musculoskeletal:  Negative for myalgias.   Skin:  Negative for rash.   Neurological:  Positive for dizziness. Negative for headaches.       Objective   Pulse (!) 121   Temp 36.9 °C (98.4 °F)   Ht 1.6 m (5' 3\")   Wt 85.4 kg (188 lb 3.2 oz)   SpO2 95%   BMI 33.34 kg/m²   BSA: 1.95 meters squared  Growth percentiles: Facility age limit for growth %shannon is 20 years. Facility age limit for growth %shannon is 20 years.     Physical Exam  Vitals reviewed.   Constitutional:       Appearance: Normal appearance.   HENT:      Right Ear: Tympanic membrane normal.      Left Ear: Tympanic membrane normal.      Nose: Nose normal.      Mouth/Throat:      Mouth: Mucous membranes are moist.      Pharynx: Oropharynx is clear.   Eyes:      Conjunctiva/sclera: Conjunctivae normal.   Cardiovascular:      Rate and Rhythm: Normal rate and regular rhythm.      Heart sounds: Normal heart sounds.   Pulmonary:      Effort: Pulmonary effort is normal.      Comments: She initially had decreased air movement with upper airway squeaks.   Following the albuterol nebulizer and oral decadron, she described decreased dyspnea.    Bilateral " wheezes in both lung fields, but much better air movement.    Musculoskeletal:      Cervical back: Neck supple.   Neurological:      Mental Status: She is alert.       Initial pulse ox was 92-93%.  Repeat pulse oximeter reading was 95% following albuterol and decadron.   Assessment/Plan   Diagnoses and all orders for this visit:  Wheezing-associated respiratory infection  -     azithromycin (Zithromax) 200 mg/5 mL suspension; Take 12.5 mL (500 mg) by mouth once daily for 1 day, THEN 6.3 mL (250 mg) once daily for 4 days.  -     albuterol 90 mcg/actuation inhaler; Inhale 2 puffs every 4 hours if needed for wheezing.  -     inhalational spacing device (Aerochamber MV) inhaler; Use as instructed with MDI  Dyspnea, unspecified type  -     dexAMETHasone (Decadron) 10 mg/mL oral liquid 12 mg  -     albuterol 2.5 mg /3 mL (0.083 %) nebulizer solution 3 mL  -     albuterol 90 mcg/actuation inhaler; Inhale 2 puffs every 4 hours if needed for wheezing.  -     inhalational spacing device (Aerochamber MV) inhaler; Use as instructed with MDI  Current respiratory symptoms are most likely secondary to either a viral infection or mycoplasma which are triggering airway inflammation.   Discussed correct technique for using the inhaler.  Will add azithromycin for possible mycoplasma coverage.    Follow up if symptoms are not improving.

## 2025-04-07 NOTE — PATIENT INSTRUCTIONS
Take  the antibiotic as prescribed.  Contact the office if symptoms are not improving over the next 2-3 days, or if any symptoms are worsening.  Take a probiotic supplement daily or eat yogurt daily  to help prevent stomach upset and diarrhea while on the antibiotic.  Use inhaler every 4 hours as needed for cough or wheezing.  Follow up later this week if symptoms are not improving, or sooner if symptoms worsen.

## 2025-04-07 NOTE — Clinical Note
April 7, 2025     Patient: Nuris Andersen   YOB: 2000   Date of Visit: 4/7/2025       To Whom It May Concern:    Nuris Andersen was seen in my clinic on 4/7/2025 at 11:00 am. Please excuse Nuris for her absence from school on this day to make the appointment.    If you have any questions or concerns, please don't hesitate to call.         Sincerely,         Radha Berrios MD        CC: No Recipients

## 2025-05-31 DIAGNOSIS — G40.909 SEIZURE DISORDER (MULTI): ICD-10-CM

## 2025-06-03 ENCOUNTER — TELEPHONE (OUTPATIENT)
Dept: PEDIATRIC NEUROLOGY | Facility: CLINIC | Age: 25
End: 2025-06-03
Payer: COMMERCIAL

## 2025-06-03 RX ORDER — OXCARBAZEPINE 300 MG/1
TABLET, FILM COATED ORAL
Qty: 105 TABLET | Refills: 5 | Status: SHIPPED | OUTPATIENT
Start: 2025-06-03

## 2025-06-09 ENCOUNTER — TELEPHONE (OUTPATIENT)
Dept: PEDIATRICS | Facility: CLINIC | Age: 25
End: 2025-06-09
Payer: COMMERCIAL

## 2025-06-09 ENCOUNTER — TELEPHONE (OUTPATIENT)
Dept: PEDIATRIC NEUROLOGY | Facility: CLINIC | Age: 25
End: 2025-06-09
Payer: COMMERCIAL

## 2025-06-09 DIAGNOSIS — Z51.81 MEDICATION MONITORING ENCOUNTER: Primary | ICD-10-CM

## 2025-06-09 NOTE — PROGRESS NOTES
Called and left detailed VM on identified VM for Nuris. Has not been seen in 4 years and needs an appt for refills.

## 2025-06-09 NOTE — TELEPHONE ENCOUNTER
Mom calling in about lab for drug screen ( on Vyvanse).  It doesn't look like we have one since 2022.  Do you want to put an order in for it?  She is due for her well visit in July.  Thanks.    Dad may lose his job tomorrow, so she would like to have her do it today.

## 2025-06-09 NOTE — PROGRESS NOTES
Mom returned my call. She does not have guardianship over Nuris and we do not have a current HIPAA form on file. Asked mom for Nuris's number since mom's is the only one in the chart.     [phone]

## 2025-06-09 NOTE — TELEPHONE ENCOUNTER
Called and spoke with mom. She does not have guardianship of Nuris and we do not have a current HIPAA form on file. Asked for Nuris's number.     [phone]      Called and left detailed VM on identified VM box. Asked her to call back as she has not been seen  in 4 years and this RN has been asking her to schedule an appointment for over 6 months to continue refills.

## 2025-06-10 LAB
ALBUMIN SERPL-MCNC: 4.7 G/DL (ref 3.6–5.1)
BUN SERPL-MCNC: 9 MG/DL (ref 7–25)
BUN/CREAT SERPL: NORMAL (CALC) (ref 6–22)
CALCIUM SERPL-MCNC: 9.4 MG/DL (ref 8.6–10.2)
CHLORIDE SERPL-SCNC: 100 MMOL/L (ref 98–110)
CO2 SERPL-SCNC: 24 MMOL/L (ref 20–32)
CREAT SERPL-MCNC: 0.54 MG/DL (ref 0.5–0.96)
EGFRCR SERPLBLD CKD-EPI 2021: 133 ML/MIN/1.73M2
GLUCOSE SERPL-MCNC: 78 MG/DL (ref 65–99)
PHOSPHATE SERPL-MCNC: 4.5 MG/DL (ref 2.5–4.5)
POTASSIUM SERPL-SCNC: 4.5 MMOL/L (ref 3.5–5.3)
SODIUM SERPL-SCNC: 138 MMOL/L (ref 135–146)

## 2025-06-11 DIAGNOSIS — E28.2 PCOS (POLYCYSTIC OVARIAN SYNDROME): ICD-10-CM

## 2025-06-12 ENCOUNTER — TELEPHONE (OUTPATIENT)
Dept: PEDIATRIC NEUROLOGY | Facility: CLINIC | Age: 25
End: 2025-06-12
Payer: COMMERCIAL

## 2025-06-12 NOTE — TELEPHONE ENCOUNTER
Called and left  for Nuris. Asked her to call back and schedule an appointment to continue refills.

## 2025-06-13 LAB
1OH-MIDAZOLAM UR-MCNC: NEGATIVE NG/ML
7AMINOCLONAZEPAM UR-MCNC: NEGATIVE NG/ML
A-OH ALPRAZ UR-MCNC: NEGATIVE NG/ML
A-OH-TRIAZOLAM UR-MCNC: NEGATIVE NG/ML
AMPHET UR-MCNC: 2681 NG/ML
AMPHETAMINES UR QL: POSITIVE NG/ML
BARBITURATES UR QL: NEGATIVE NG/ML
BZE UR QL: NEGATIVE NG/ML
CODEINE UR-MCNC: NEGATIVE NG/ML
CREAT UR-MCNC: 122.2 MG/DL
DRUG SCREEN COMMENT UR-IMP: ABNORMAL
EDDP UR-MCNC: NEGATIVE NG/ML
FENTANYL UR-MCNC: NEGATIVE NG/ML
HYDROCODONE UR-MCNC: NEGATIVE NG/ML
HYDROMORPHONE UR-MCNC: NEGATIVE NG/ML
LORAZEPAM UR-MCNC: NEGATIVE NG/ML
METHADONE UR-MCNC: NEGATIVE NG/ML
METHAMPHET UR-MCNC: NEGATIVE NG/ML
MORPHINE UR-MCNC: NEGATIVE NG/ML
NORDIAZEPAM UR-MCNC: NEGATIVE NG/ML
NORFENTANYL UR-MCNC: NEGATIVE NG/ML
NORHYDROCODONE UR CFM-MCNC: NEGATIVE NG/ML
NOROXYCODONE UR CFM-MCNC: NEGATIVE NG/ML
NORTRAMADOL UR-MCNC: NEGATIVE NG/ML
OH-ETHYLFLURAZ UR-MCNC: NEGATIVE NG/ML
OXAZEPAM UR-MCNC: NEGATIVE NG/ML
OXIDANTS UR QL: NEGATIVE MCG/ML
OXYCODONE UR CFM-MCNC: NEGATIVE NG/ML
OXYMORPHONE UR CFM-MCNC: NEGATIVE NG/ML
PCP UR QL: NEGATIVE NG/ML
PH UR: 7.2 [PH] (ref 4.5–9)
QUEST 6 ACETYLMORPHINE: NEGATIVE NG/ML
QUEST NOTES AND COMMENTS: ABNORMAL
QUEST ZOLPIDEM: NEGATIVE NG/ML
TEMAZEPAM UR-MCNC: NEGATIVE NG/ML
THC UR QL: NEGATIVE NG/ML
TRAMADOL UR-MCNC: NEGATIVE NG/ML
ZOLPIDEM PHENYL-4-CARB UR CFM-MCNC: NEGATIVE NG/ML

## 2025-07-05 DIAGNOSIS — E28.2 PCOS (POLYCYSTIC OVARIAN SYNDROME): ICD-10-CM

## 2025-07-11 DIAGNOSIS — E28.2 PCOS (POLYCYSTIC OVARIAN SYNDROME): ICD-10-CM

## 2025-07-11 RX ORDER — METFORMIN HYDROCHLORIDE 500 MG/1
1000 TABLET ORAL 2 TIMES DAILY
Qty: 120 TABLET | Refills: 5 | Status: SHIPPED | OUTPATIENT
Start: 2025-07-11

## 2025-07-11 RX ORDER — NORGESTIMATE AND ETHINYL ESTRADIOL 0.25-0.035
1 KIT ORAL DAILY
Qty: 28 TABLET | Refills: 5 | Status: SHIPPED | OUTPATIENT
Start: 2025-07-11

## 2025-07-14 RX ORDER — METFORMIN HYDROCHLORIDE 500 MG/1
1000 TABLET ORAL 2 TIMES DAILY
Qty: 120 TABLET | Refills: 10 | OUTPATIENT
Start: 2025-07-14

## 2025-07-14 RX ORDER — NORGESTIMATE AND ETHINYL ESTRADIOL 0.25-0.035
1 KIT ORAL DAILY
Qty: 28 TABLET | Refills: 2 | OUTPATIENT
Start: 2025-07-14

## 2025-07-17 DIAGNOSIS — F90.2 ATTENTION DEFICIT HYPERACTIVITY DISORDER (ADHD), COMBINED TYPE: ICD-10-CM

## 2025-07-18 RX ORDER — LISDEXAMFETAMINE DIMESYLATE 30 MG/1
60 CAPSULE ORAL EVERY MORNING
Qty: 60 CAPSULE | Refills: 0 | Status: SHIPPED | OUTPATIENT
Start: 2025-07-18 | End: 2025-08-17

## 2025-07-22 ENCOUNTER — APPOINTMENT (OUTPATIENT)
Dept: PEDIATRICS | Facility: CLINIC | Age: 25
End: 2025-07-22
Payer: COMMERCIAL

## 2025-07-22 VITALS
HEIGHT: 63 IN | DIASTOLIC BLOOD PRESSURE: 76 MMHG | WEIGHT: 190.7 LBS | SYSTOLIC BLOOD PRESSURE: 98 MMHG | TEMPERATURE: 97.4 F | BODY MASS INDEX: 33.79 KG/M2 | OXYGEN SATURATION: 97 % | HEART RATE: 105 BPM

## 2025-07-22 DIAGNOSIS — F33.41 RECURRENT MAJOR DEPRESSIVE DISORDER, IN PARTIAL REMISSION: ICD-10-CM

## 2025-07-22 DIAGNOSIS — F90.2 ATTENTION DEFICIT HYPERACTIVITY DISORDER (ADHD), COMBINED TYPE: ICD-10-CM

## 2025-07-22 DIAGNOSIS — J30.1 SEASONAL ALLERGIC RHINITIS DUE TO POLLEN: ICD-10-CM

## 2025-07-22 DIAGNOSIS — N62 MACROMASTIA: ICD-10-CM

## 2025-07-22 DIAGNOSIS — R06.2 WHEEZING: Primary | ICD-10-CM

## 2025-07-22 DIAGNOSIS — Z00.00 WELL ADULT EXAM: ICD-10-CM

## 2025-07-22 DIAGNOSIS — L30.9 ECZEMA, UNSPECIFIED TYPE: ICD-10-CM

## 2025-07-22 PROCEDURE — 1036F TOBACCO NON-USER: CPT | Performed by: PEDIATRICS

## 2025-07-22 PROCEDURE — 96127 BRIEF EMOTIONAL/BEHAV ASSMT: CPT | Performed by: PEDIATRICS

## 2025-07-22 PROCEDURE — 3008F BODY MASS INDEX DOCD: CPT | Performed by: PEDIATRICS

## 2025-07-22 PROCEDURE — 99214 OFFICE O/P EST MOD 30 MIN: CPT | Performed by: PEDIATRICS

## 2025-07-22 PROCEDURE — 99395 PREV VISIT EST AGE 18-39: CPT | Performed by: PEDIATRICS

## 2025-07-22 RX ORDER — CETIRIZINE HYDROCHLORIDE 10 MG/1
10 TABLET ORAL DAILY
Qty: 30 TABLET | Refills: 11 | Status: SHIPPED | OUTPATIENT
Start: 2025-07-22 | End: 2026-07-22

## 2025-07-22 RX ORDER — LISDEXAMFETAMINE DIMESYLATE 30 MG/1
60 CAPSULE ORAL EVERY MORNING
Qty: 60 CAPSULE | Refills: 0 | Status: SHIPPED | OUTPATIENT
Start: 2025-09-12 | End: 2025-10-12

## 2025-07-22 RX ORDER — LISDEXAMFETAMINE DIMESYLATE 30 MG/1
60 CAPSULE ORAL EVERY MORNING
Qty: 60 CAPSULE | Refills: 0 | Status: SHIPPED | OUTPATIENT
Start: 2025-08-15 | End: 2025-09-14

## 2025-07-22 RX ORDER — SERTRALINE HYDROCHLORIDE 100 MG/1
100 TABLET, FILM COATED ORAL 2 TIMES DAILY
Qty: 60 TABLET | Refills: 5 | Status: SHIPPED | OUTPATIENT
Start: 2025-07-22 | End: 2026-01-18

## 2025-07-22 RX ORDER — DESONIDE 0.5 MG/G
OINTMENT TOPICAL 2 TIMES DAILY
Qty: 60 G | Refills: 1 | Status: SHIPPED | OUTPATIENT
Start: 2025-07-22 | End: 2026-07-22

## 2025-07-22 RX ORDER — LISDEXAMFETAMINE DIMESYLATE 30 MG/1
60 CAPSULE ORAL EVERY MORNING
Qty: 60 CAPSULE | Refills: 0 | Status: SHIPPED | OUTPATIENT
Start: 2025-10-10 | End: 2025-11-09

## 2025-07-22 RX ORDER — KETOTIFEN FUMARATE 0.35 MG/ML
1 SOLUTION/ DROPS OPHTHALMIC 2 TIMES DAILY
Qty: 10 ML | Refills: 6 | Status: SHIPPED | OUTPATIENT
Start: 2025-07-22

## 2025-07-22 ASSESSMENT — ANXIETY QUESTIONNAIRES
2. NOT BEING ABLE TO STOP OR CONTROL WORRYING: NOT AT ALL
7. FEELING AFRAID AS IF SOMETHING AWFUL MIGHT HAPPEN: NOT AT ALL
1. FEELING NERVOUS, ANXIOUS, OR ON EDGE: NOT AT ALL
7. FEELING AFRAID AS IF SOMETHING AWFUL MIGHT HAPPEN: NOT AT ALL
5. BEING SO RESTLESS THAT IT IS HARD TO SIT STILL: NOT AT ALL
6. BECOMING EASILY ANNOYED OR IRRITABLE: SEVERAL DAYS
4. TROUBLE RELAXING: NOT AT ALL
1. FEELING NERVOUS, ANXIOUS, OR ON EDGE: NOT AT ALL
3. WORRYING TOO MUCH ABOUT DIFFERENT THINGS: NOT AT ALL
GAD7 TOTAL SCORE: 1
5. BEING SO RESTLESS THAT IT IS HARD TO SIT STILL: NOT AT ALL
6. BECOMING EASILY ANNOYED OR IRRITABLE: SEVERAL DAYS
3. WORRYING TOO MUCH ABOUT DIFFERENT THINGS: NOT AT ALL
IF YOU CHECKED OFF ANY PROBLEMS ON THIS QUESTIONNAIRE, HOW DIFFICULT HAVE THESE PROBLEMS MADE IT FOR YOU TO DO YOUR WORK, TAKE CARE OF THINGS AT HOME, OR GET ALONG WITH OTHER PEOPLE: NOT DIFFICULT AT ALL
4. TROUBLE RELAXING: NOT AT ALL
IF YOU CHECKED OFF ANY PROBLEMS ON THIS QUESTIONNAIRE, HOW DIFFICULT HAVE THESE PROBLEMS MADE IT FOR YOU TO DO YOUR WORK, TAKE CARE OF THINGS AT HOME, OR GET ALONG WITH OTHER PEOPLE: NOT DIFFICULT AT ALL
2. NOT BEING ABLE TO STOP OR CONTROL WORRYING: NOT AT ALL

## 2025-07-22 ASSESSMENT — PATIENT HEALTH QUESTIONNAIRE - PHQ9
9. THOUGHTS THAT YOU WOULD BE BETTER OFF DEAD, OR OF HURTING YOURSELF: NOT AT ALL
9. THOUGHTS THAT YOU WOULD BE BETTER OFF DEAD, OR OF HURTING YOURSELF: NOT AT ALL
7. TROUBLE CONCENTRATING ON THINGS, SUCH AS READING THE NEWSPAPER OR WATCHING TELEVISION: NOT AT ALL
6. FEELING BAD ABOUT YOURSELF - OR THAT YOU ARE A FAILURE OR HAVE LET YOURSELF OR YOUR FAMILY DOWN: NOT AT ALL
6. FEELING BAD ABOUT YOURSELF - OR THAT YOU ARE A FAILURE OR HAVE LET YOURSELF OR YOUR FAMILY DOWN: NOT AT ALL
8. MOVING OR SPEAKING SO SLOWLY THAT OTHER PEOPLE COULD HAVE NOTICED. OR THE OPPOSITE - BEING SO FIDGETY OR RESTLESS THAT YOU HAVE BEEN MOVING AROUND A LOT MORE THAN USUAL: NOT AT ALL
1. LITTLE INTEREST OR PLEASURE IN DOING THINGS: NOT AT ALL
10. IF YOU CHECKED OFF ANY PROBLEMS, HOW DIFFICULT HAVE THESE PROBLEMS MADE IT FOR YOU TO DO YOUR WORK, TAKE CARE OF THINGS AT HOME, OR GET ALONG WITH OTHER PEOPLE: NOT DIFFICULT AT ALL
3. TROUBLE FALLING OR STAYING ASLEEP: SEVERAL DAYS
2. FEELING DOWN, DEPRESSED OR HOPELESS: NOT AT ALL
1. LITTLE INTEREST OR PLEASURE IN DOING THINGS: NOT AT ALL
5. POOR APPETITE OR OVEREATING: NOT AT ALL
7. TROUBLE CONCENTRATING ON THINGS, SUCH AS READING THE NEWSPAPER OR WATCHING TELEVISION: NOT AT ALL
4. FEELING TIRED OR HAVING LITTLE ENERGY: SEVERAL DAYS
3. TROUBLE FALLING OR STAYING ASLEEP OR SLEEPING TOO MUCH: SEVERAL DAYS
5. POOR APPETITE OR OVEREATING: NOT AT ALL
8. MOVING OR SPEAKING SO SLOWLY THAT OTHER PEOPLE COULD HAVE NOTICED. OR THE OPPOSITE, BEING SO FIGETY OR RESTLESS THAT YOU HAVE BEEN MOVING AROUND A LOT MORE THAN USUAL: NOT AT ALL
4. FEELING TIRED OR HAVING LITTLE ENERGY: SEVERAL DAYS
10. IF YOU CHECKED OFF ANY PROBLEMS, HOW DIFFICULT HAVE THESE PROBLEMS MADE IT FOR YOU TO DO YOUR WORK, TAKE CARE OF THINGS AT HOME, OR GET ALONG WITH OTHER PEOPLE: NOT DIFFICULT AT ALL
SUM OF ALL RESPONSES TO PHQ9 QUESTIONS 1 & 2: 0
SUM OF ALL RESPONSES TO PHQ QUESTIONS 1-9: 2
2. FEELING DOWN, DEPRESSED OR HOPELESS: NOT AT ALL

## 2025-07-22 NOTE — PATIENT INSTRUCTIONS
Patient Information:  Name: Nuris Andersen  Age: 25  Medical History: Autism, depression, ADHD, seizures, prediabetes, PCOS    Reason for Visit:  Nuris visited the clinic due to eye infections, breathing difficulties, and skin rashes. She also discussed her current medication management and general health concerns.    Clinical Findings:  - Eye infections: Redness, itchiness, and watery discharge, likely due to allergies.  - Breathing difficulties: Uses albuterol as needed, experienced viral-induced asthma.  - Skin rashes: Eczema patch noted on the left lateral malleolus.  - General health: No fever, chills, changes in behavior, difficulties with eating, drinking, peeing, pooping, or sleeping. No ear complaints, runny nose, nasal congestion, or sore throat. No chest pain, heart palpitations, shortness of breath, wheezing, fatigue on exertion, or cough. Occasionally takes Tums smoothies after working out, finds helpful for heart and stomach. Drinks broth when feeling unwell. No vomiting, diarrhea, constipation, abnormal urination, muscle or joint complaints, or skin rashes. Alert and focused, no confusion, weakness, dizziness, headaches, excessive thirst, bleeding, bruising, or swollen glands.    Diagnosis:  - Eye infection  - Depression  - Anxiety  - Asthma  - Eczema  - Polycystic Ovary Syndrome (PCOS)  - Attention Deficit Hyperactivity Disorder (ADHD)    Treatment Plan:  - Eye infection: Recommended Zaditor eye drops, 1 drop per eye twice a day for 2 to 3 days before resuming contact lens use.  - Depression: Prescription for sertraline 100 mg twice a day.  - Anxiety: Current medication regimen is effective.  - Asthma: Prescribed RespiClick inhaler as an alternative to current albuterol inhaler.  - Eczema: Prescribed Desonide ointment for the eczema patch.  - PCOS: Currently on Aldactone, metformin, and a hormone pill for management.  - ADHD: Provided physical prescription for Vyvanse 30 mg twice a day.  -  Health Maintenance: Advised to consume 2 cups of milk daily or take vitamin D supplements if milk intake is insufficient. Provided referral for breast reduction surgery.    Follow-Up Instructions:  - Use Zaditor eye drops, 1 drop per eye twice a day for 2 to 3 days before resuming contact lens use.  - Continue taking sertraline 100 mg twice a day.  - Use RespiClick inhaler if covered by insurance; otherwise, continue using the spacer with the albuterol inhaler.  - Apply Desonide ointment to the eczema patch as prescribed.  - Ensure daily intake of 2 cups of milk or take vitamin D supplements if milk intake is insufficient.  - Schedule follow-up visit for breast reduction surgery referral.    Additional Notes:  - Nuris is making efforts to exercise, helping maintain weight and build muscle.  - Occasionally experiences difficulty falling asleep or staying asleep, attributes to PCOS.  - Seeing a dentist regularly, feels something wrong with gum, possibly an extra tooth under one of her molars.  - Engaging in social activities, formed a bond with a 19-year-old boy at [facility], participates in a social group with [employer] once or twice a week.  - Considering dating but feels it is not the right time.  - Practices safety measures such as always swimming under supervision, using sunscreen, drinking plenty of water when out in the sun, keeping bedroom door closed at night, always wearing a seatbelt, and avoiding getting into a car with anyone who has been drinking.

## 2025-07-22 NOTE — PROGRESS NOTES
History of Present Illness  The patient is a 25-year-old female with autism, depression, ADHD, seizures, prediabetes, and PCOS. She is accompanied by her mother.    Eye Infections  - Experiencing redness, itchiness, and watery discharge  - Suspects an allergy as the cause  - Has not tried Zaditor but uses generic Visine  - Wears contact lenses and recently felt like there was something in her eye  - Tries to avoid touching her eyes with her fingers and uses a cloth instead    Breathing Difficulties  - Uses albuterol as needed, although not frequently  - Experienced a brief illness a few months ago causing breathing difficulties, necessitating albuterol use for several days  - Illness was not related to allergies  - Found it difficult to breathe through a spacer, used inhaler directly instead  - First time using albuterol    Medication Management  - Taking Vyvanse 30 mg, 2 tablets every morning, which seems effective  - Last controlled substance agreement on 01/30/2025  - Recently had a urine test  - Taking Zoloft 100 mg twice a day, managing symptoms well  - Reports feeling fine and does not need an adjustment in medication    Skin Rashes  - Experiencing skin rashes on her legs, believes due to cat scratching    General Health  - Reports no fever, chills, changes in behavior, difficulties with eating, drinking, peeing, pooping, or sleeping  - Reports no ear complaints, runny nose, nasal congestion, or sore throat  - Reports no chest pain, heart palpitations, shortness of breath, wheezing, fatigue on exertion, or cough  - Occasionally takes Tums smoothies after working out, finds helpful for heart and stomach  - Drinks broth when feeling unwell  - Reports no vomiting, diarrhea, constipation, abnormal urination, muscle or joint complaints, or skin rashes  - Alert and focused, reports no confusion, weakness, dizziness, headaches, excessive thirst, bleeding, bruising, or swollen glands    Nutrition/Diet  - Trying to  "improve diet, includes milk in meals  - Does not take any vitamins    Activities/Interests  - Making efforts to exercise, helping maintain weight and build muscle    Sleep  - Occasionally experiences difficulty falling asleep or staying asleep, attributes to PCOS    Dental Health  - Seeing a dentist regularly, feels something wrong with gum, possibly an extra tooth under one of her molars    Developmental Milestones    Psychosocial    - Engaging in social activities    - Formed a bond with a 19-year-old boy at [facility]    - Participates in a social group with [employer] once or twice a week    - Considering dating but feels it is not the right time    Safety Practices  - Does not own a gun  - Always swims under supervision  - Uses sunscreen and drinks plenty of water when out in the sun  - No one in household smokes  - Keeps bedroom door closed at night  - Always wears seatbelt  - Does not drive due to difficulty maintaining attention on the road  - Avoids getting into a car with anyone who has been drinking    Gynecological History    Duration    - About 2 to 3 days    Flow    - Heavy bleeding on the first 1 or 2 days    Elimination  - Reports no issues with bowel movements or urination       Current Medications[1]     Allergies[2]     Family History[3]     BP 98/76   Pulse 105   Temp 36.3 °C (97.4 °F)   Ht 1.608 m (5' 3.3\")   Wt 86.5 kg (190 lb 11.2 oz)   SpO2 97%   BMI 33.46 kg/m²   Growth percentiles: Facility age limit for growth %shannon is 20 years. Facility age limit for growth %shannon is 20 years.   Physical Exam     Constitutional: Well developed, well nourished, well hydrated and no acute distress.  Head and Face: Normocephalic, atraumatic.  Inspection and palpation of the face: Normal.  Eyes: Conjunctiva and lids normal. Pupils equal, round, reactive to light. Extraocular movement normal.  Ears, Nose, Mouth, and Throat: No nasal discharge. External ears and nose without " deformities. TM's normal color, normal landmarks, no fluid, non-retracted. External auditory canals without swelling, redness or tenderness. Pharyngeal mucosa normal. No erythema, exudate, or lesions. Mucous membranes moist. Internal nose without abnormalities.  Neck: Full range of motion. No significant cervical adenopathy. Thyroid not enlarged.  Pulmonary: No grunting, flaring or retractions. Clear to auscultation.  Cardiovascular: Regular rate and rhythm. No significant murmur.  Chest: Normal without deformity.  Abdomen: Soft, non-tender, no masses. No hepatomegaly or splenomegaly.  Genitourinary: Normal external genitalia.  Musculoskeletal: No decrease in range of motion. Muscle strength and tone are normal. No joint swelling or bone tenderness, erythema, swelling or warmth.  Digits and nails: Normal without clubbing or cyanosis.  Skin: Skin: Eczema patch noted on the left lateral malleolus.  Palpation of skin and subcutaneous tissue: Normal skin turgor.  Neurologic: Cranial nerves grossly intact and face symmetric. Normal deep tendon reflexes. Gait within normal limits for age.  Psychiatric: Normal parent/child interaction, no apparent care giver depression.      Results         Diagnoses and all orders for this visit:  Wheezing  -     albuterol 90 mcg/actuation aerosol powdr breath activated inhaler; Inhale 2 puffs every 4 hours if needed for wheezing.  Seasonal allergic rhinitis due to pollen  -     cetirizine (ZyrTEC) 10 mg tablet; Take 1 tablet (10 mg) by mouth once daily.  -     ketotifen (Zaditor) 0.025 % (0.035 %) ophthalmic solution; Administer 1 drop into both eyes 2 times a day.  Attention deficit hyperactivity disorder (ADHD), combined type  -     lisdexamfetamine (Vyvanse) 30 mg capsule; Take 2 capsules (60 mg) by mouth once daily in the morning. Do not fill before October 10, 2025.  -     lisdexamfetamine (Vyvanse) 30 mg capsule; Take 2 capsules (60 mg) by mouth once daily in the morning. Do not  fill before September 12, 2025.  -     lisdexamfetamine (Vyvanse) 30 mg capsule; Take 2 capsules (60 mg) by mouth once daily in the morning. Do not fill before August 15, 2025.  Recurrent major depressive disorder, in partial remission  -     sertraline (Zoloft) 100 mg tablet; Take 1 tablet (100 mg) by mouth 2 times a day.  Eczema, unspecified type  -     desonide (DesOwen) 0.05 % ointment; Apply topically 2 times a day. Apply to affected area  Macromastia  -     Referral to Breast Surgery; Future  Well adult exam  Other orders  -     1 Year Follow Up; Future      Assessment & Plan  1. Eye infection  - Reports red, itchy, and watery eyes, likely due to allergies  - Recommended Zaditor eye drops, 1 drop per eye twice a day  - Advised to use Zaditor for 2 to 3 days before resuming contact lens use    2. Depression  - Depression screen score is 2, indicating effective management with sertraline  - Provided prescription for sertraline 100 mg twice a day    3. Anxiety  - PASCUAL-7 score is 1, suggesting well-controlled anxiety with current medication regimen    4. Asthma  - Experiences viral-induced asthma  - Has used albuterol in the past  - Prescribed RespiClick inhaler as an alternative to current albuterol inhaler due to difficulties using a spacer  - May need to continue using the spacer if RespiClick is not covered by insurance    5. Eczema  - Noted small eczema patch on the side in front of left lateral malleolus  - Prescribed Desonide ointment for the eczema patch    6. Polycystic Ovary Syndrome (PCOS)  - Currently on Aldactone, metformin, and a hormone pill for PCOS management    7. Attention Deficit Hyperactivity Disorder (ADHD)  - Currently taking Vyvanse 30 mg twice a day  - Provided physical prescription for Vyvanse    8. Health Maintenance  - Advised to consume 2 cups of milk daily or take vitamin D supplements if milk intake is insufficient  - Provided referral for breast reduction surgery              [1]    Current Outpatient Medications   Medication Sig Dispense Refill    albuterol 90 mcg/actuation aerosol powdr breath activated inhaler Inhale 2 puffs every 4 hours if needed for wheezing. 1 each 3    albuterol 90 mcg/actuation inhaler Inhale 2 puffs every 4 hours if needed for wheezing. 18 g 0    cetirizine (ZyrTEC) 10 mg tablet Take 1 tablet (10 mg) by mouth once daily. 30 tablet 11    desonide (DesOwen) 0.05 % ointment Apply topically 2 times a day. Apply to affected area 60 g 1    inhalational spacing device (Aerochamber MV) inhaler Use as instructed with MDI 1 each 0    ketotifen (Zaditor) 0.025 % (0.035 %) ophthalmic solution Administer 1 drop into both eyes 2 times a day. 10 mL 6    [START ON 10/10/2025] lisdexamfetamine (Vyvanse) 30 mg capsule Take 2 capsules (60 mg) by mouth once daily in the morning. Do not fill before October 10, 2025. 60 capsule 0    [START ON 9/12/2025] lisdexamfetamine (Vyvanse) 30 mg capsule Take 2 capsules (60 mg) by mouth once daily in the morning. Do not fill before September 12, 2025. 60 capsule 0    [START ON 8/15/2025] lisdexamfetamine (Vyvanse) 30 mg capsule Take 2 capsules (60 mg) by mouth once daily in the morning. Do not fill before August 15, 2025. 60 capsule 0    metFORMIN (Glucophage) 500 mg tablet Take 2 tablets (1,000 mg) by mouth 2 times a day. Take with food. 120 tablet 5    norgestimate-ethinyl estradiol (Ortho-Cyclen) 0.25-0.035 mg tablet Take 1 tablet by mouth once daily. 28 tablet 5    OXcarbazepine (Trileptal) 300 mg tablet TAKE 1 AND 1/2 TABLETS BY MOUTH EVERY MORNING AND 2 TABS EVERY EVENING 105 tablet 5    sertraline (Zoloft) 100 mg tablet Take 1 tablet (100 mg) by mouth 2 times a day. 60 tablet 5    spironolactone (Aldactone) 50 mg tablet Take 1 tablet (50 mg) by mouth once daily. 30 tablet 11     No current facility-administered medications for this visit.   [2]   Allergies  Allergen Reactions    Pollen Extracts Runny nose   [3]   Family History  Problem  Relation Name Age of Onset    Depression Father Leonardo Ganesh     Cancer Maternal Grandfather Amos Ware     Cancer Maternal Grandmother Josee Jackson     Asthma Brother Win Andersen     Depression Brother Win Andersen

## 2025-07-23 DIAGNOSIS — N62 MACROMASTIA: Primary | ICD-10-CM

## 2025-08-28 ENCOUNTER — OFFICE VISIT (OUTPATIENT)
Dept: PEDIATRIC NEUROLOGY | Facility: CLINIC | Age: 25
End: 2025-08-28
Payer: COMMERCIAL

## 2025-08-28 VITALS
DIASTOLIC BLOOD PRESSURE: 84 MMHG | SYSTOLIC BLOOD PRESSURE: 124 MMHG | HEIGHT: 64 IN | WEIGHT: 184.75 LBS | BODY MASS INDEX: 31.54 KG/M2 | HEART RATE: 103 BPM

## 2025-08-28 DIAGNOSIS — G40.909 NONINTRACTABLE EPILEPSY WITHOUT STATUS EPILEPTICUS, UNSPECIFIED EPILEPSY TYPE (MULTI): Primary | ICD-10-CM

## 2025-08-28 PROBLEM — J06.9 VIRAL UPPER RESPIRATORY TRACT INFECTION: Status: RESOLVED | Noted: 2025-01-30 | Resolved: 2025-08-28

## 2025-08-28 PROCEDURE — 3008F BODY MASS INDEX DOCD: CPT | Performed by: PSYCHIATRY & NEUROLOGY

## 2025-08-28 PROCEDURE — 99203 OFFICE O/P NEW LOW 30 MIN: CPT | Performed by: PSYCHIATRY & NEUROLOGY

## 2025-08-28 PROCEDURE — 99212 OFFICE O/P EST SF 10 MIN: CPT | Performed by: PSYCHIATRY & NEUROLOGY

## 2025-08-28 ASSESSMENT — ENCOUNTER SYMPTOMS: SEIZURES: 1

## 2025-11-11 ENCOUNTER — APPOINTMENT (OUTPATIENT)
Dept: PEDIATRIC ENDOCRINOLOGY | Facility: CLINIC | Age: 25
End: 2025-11-11
Payer: COMMERCIAL